# Patient Record
Sex: FEMALE | Race: BLACK OR AFRICAN AMERICAN | Employment: FULL TIME | URBAN - METROPOLITAN AREA
[De-identification: names, ages, dates, MRNs, and addresses within clinical notes are randomized per-mention and may not be internally consistent; named-entity substitution may affect disease eponyms.]

---

## 2017-04-26 ENCOUNTER — TRANSFERRED RECORDS (OUTPATIENT)
Dept: HEALTH INFORMATION MANAGEMENT | Facility: CLINIC | Age: 47
End: 2017-04-26

## 2017-08-01 ENCOUNTER — TRANSFERRED RECORDS (OUTPATIENT)
Dept: HEALTH INFORMATION MANAGEMENT | Facility: CLINIC | Age: 47
End: 2017-08-01

## 2019-03-09 ENCOUNTER — HOSPITAL ENCOUNTER (EMERGENCY)
Facility: CLINIC | Age: 49
Discharge: HOME | End: 2019-03-09
Attending: EMERGENCY MEDICINE | Admitting: EMERGENCY MEDICINE
Payer: COMMERCIAL

## 2019-03-09 VITALS
HEART RATE: 79 BPM | TEMPERATURE: 98 F | WEIGHT: 175.71 LBS | RESPIRATION RATE: 18 BRPM | SYSTOLIC BLOOD PRESSURE: 142 MMHG | DIASTOLIC BLOOD PRESSURE: 81 MMHG

## 2019-03-09 DIAGNOSIS — N60.02 BILATERAL BREAST CYSTS: ICD-10-CM

## 2019-03-09 DIAGNOSIS — N60.01 BILATERAL BREAST CYSTS: ICD-10-CM

## 2019-03-09 DIAGNOSIS — L73.9 FOLLICULITIS: ICD-10-CM

## 2019-03-09 LAB
ALBUMIN SERPL-MCNC: 3.6 G/DL (ref 3.4–5)
ALP SERPL-CCNC: 55 U/L (ref 40–150)
ALT SERPL W P-5'-P-CCNC: 21 U/L (ref 0–50)
ANION GAP SERPL CALCULATED.3IONS-SCNC: 8 MMOL/L (ref 3–14)
AST SERPL W P-5'-P-CCNC: 14 U/L (ref 0–45)
BASOPHILS # BLD AUTO: 0 10E9/L (ref 0–0.2)
BASOPHILS NFR BLD AUTO: 0.7 %
BILIRUB SERPL-MCNC: 0.4 MG/DL (ref 0.2–1.3)
BUN SERPL-MCNC: 15 MG/DL (ref 7–30)
CALCIUM SERPL-MCNC: 9 MG/DL (ref 8.5–10.1)
CHLORIDE SERPL-SCNC: 108 MMOL/L (ref 94–109)
CO2 SERPL-SCNC: 23 MMOL/L (ref 20–32)
CREAT SERPL-MCNC: 0.92 MG/DL (ref 0.52–1.04)
DIFFERENTIAL METHOD BLD: NORMAL
EOSINOPHIL # BLD AUTO: 0.1 10E9/L (ref 0–0.7)
EOSINOPHIL NFR BLD AUTO: 1.2 %
ERYTHROCYTE [DISTWIDTH] IN BLOOD BY AUTOMATED COUNT: 12.7 % (ref 10–15)
GFR SERPL CREATININE-BSD FRML MDRD: 73 ML/MIN/{1.73_M2}
GLUCOSE SERPL-MCNC: 78 MG/DL (ref 70–99)
HCT VFR BLD AUTO: 44.9 % (ref 35–47)
HGB BLD-MCNC: 14.2 G/DL (ref 11.7–15.7)
IMM GRANULOCYTES # BLD: 0 10E9/L (ref 0–0.4)
IMM GRANULOCYTES NFR BLD: 0.2 %
LYMPHOCYTES # BLD AUTO: 2.6 10E9/L (ref 0.8–5.3)
LYMPHOCYTES NFR BLD AUTO: 42.7 %
MCH RBC QN AUTO: 30.7 PG (ref 26.5–33)
MCHC RBC AUTO-ENTMCNC: 31.6 G/DL (ref 31.5–36.5)
MCV RBC AUTO: 97 FL (ref 78–100)
MONOCYTES # BLD AUTO: 0.4 10E9/L (ref 0–1.3)
MONOCYTES NFR BLD AUTO: 6.1 %
NEUTROPHILS # BLD AUTO: 3 10E9/L (ref 1.6–8.3)
NEUTROPHILS NFR BLD AUTO: 49.1 %
NRBC # BLD AUTO: 0 10*3/UL
NRBC BLD AUTO-RTO: 0 /100
PLATELET # BLD AUTO: 180 10E9/L (ref 150–450)
POTASSIUM SERPL-SCNC: 4.2 MMOL/L (ref 3.4–5.3)
PROT SERPL-MCNC: 7 G/DL (ref 6.8–8.8)
RBC # BLD AUTO: 4.62 10E12/L (ref 3.8–5.2)
SODIUM SERPL-SCNC: 139 MMOL/L (ref 133–144)
WBC # BLD AUTO: 6 10E9/L (ref 4–11)

## 2019-03-09 PROCEDURE — 85025 COMPLETE CBC W/AUTO DIFF WBC: CPT | Performed by: EMERGENCY MEDICINE

## 2019-03-09 PROCEDURE — 99283 EMERGENCY DEPT VISIT LOW MDM: CPT | Mod: Z6 | Performed by: EMERGENCY MEDICINE

## 2019-03-09 PROCEDURE — 36415 COLL VENOUS BLD VENIPUNCTURE: CPT | Performed by: EMERGENCY MEDICINE

## 2019-03-09 PROCEDURE — 80053 COMPREHEN METABOLIC PANEL: CPT | Performed by: EMERGENCY MEDICINE

## 2019-03-09 PROCEDURE — 99283 EMERGENCY DEPT VISIT LOW MDM: CPT | Performed by: EMERGENCY MEDICINE

## 2019-03-09 RX ORDER — ASPIRIN 81 MG/1
81 TABLET, CHEWABLE ORAL DAILY
COMMUNITY
End: 2019-03-12

## 2019-03-09 SDOH — HEALTH STABILITY: MENTAL HEALTH: HOW OFTEN DO YOU HAVE A DRINK CONTAINING ALCOHOL?: NEVER

## 2019-03-09 ASSESSMENT — ENCOUNTER SYMPTOMS
MYALGIAS: 1
FREQUENCY: 1
FATIGUE: 1
FEVER: 0

## 2019-03-09 NOTE — ED PROVIDER NOTES
Transfer EMERGENCY DEPARTMENT ([facility])  March 9, 2019    History     Chief Complaint   Patient presents with     Breast Mass     HPI  Kala Woo is a 48 year old female who presents the ED with rash for the past month.  She states that initially the rash was on her back in her bilateral breast, but now is more localized to her right breast and back.  She states that her mother also has this rash.  Patient reports that both her mother and her brothers have multiple myeloma and her sister has ovarian cancer.  She also complains of urinary frequency, fatigue, and generalized myalgias.  She states she still has regular menstrual cycles.  She otherwise currently denies any fevers or chance of pregnancy.    PAST MEDICAL HISTORY  History reviewed. No pertinent past medical history.  PAST SURGICAL HISTORY  History reviewed. No pertinent surgical history.  FAMILY HISTORY  History reviewed. No pertinent family history.  SOCIAL HISTORY  Social History     Tobacco Use     Smoking status: Current Every Day Smoker     Smokeless tobacco: Never Used   Substance Use Topics     Alcohol use: Yes     Frequency: Never     MEDICATIONS  No current facility-administered medications for this encounter.      Current Outpatient Medications   Medication     aspirin (ASA) 81 MG chewable tablet     ALLERGIES  No Known Allergies    I have reviewed the Medications, Allergies, Past Medical and Surgical History, and Social History in the Epic system.    Review of Systems   Constitutional: Positive for fatigue. Negative for fever.   Genitourinary: Positive for frequency.   Musculoskeletal: Positive for myalgias (generalized).   Skin: Positive for rash (R breast and back).   All other systems reviewed and are negative.      Physical Exam   BP: 142/81  Pulse: 79  Temp: 98  F (36.7  C)  Resp: 18  Weight: 79.7 kg (175 lb 11.3 oz)      Physical Exam   Constitutional: No distress.   HENT:   Head: Atraumatic.   Mouth/Throat: Oropharynx  is clear and moist. No oropharyngeal exudate.   Eyes: Pupils are equal, round, and reactive to light. No scleral icterus.   Cardiovascular: Normal heart sounds and intact distal pulses.   Pulmonary/Chest: Breath sounds normal. No respiratory distress.       Abdominal: Soft. Bowel sounds are normal. There is no tenderness.   Musculoskeletal: She exhibits no edema or tenderness.   Skin: Skin is warm. Rash noted. No lesion and no petechiae noted. Rash is papular ( Hyperpigmented slightly raised areas around follicles on her back, no pattern). She is not diaphoretic. No erythema.       ED Course        Procedures   3:31 PM  The patient was seen and examined by Dr. Gates in [facility].                Critical Care time:  none             Labs Ordered and Resulted from Time of ED Arrival Up to the Time of Departure from the ED   CBC WITH PLATELETS DIFFERENTIAL   COMPREHENSIVE METABOLIC PANEL            Assessments & Plan (with Medical Decision Making)   The patient has multiple small breast nodules consistent with fibrocystic breast changes.  I think she does not have any clinical signs of abscess.  She also has a rash on her back most consistent with folliculitis but there is no signs of petechiae or significant infection.  She was somewhat anxious but reassured that her laboratory tests are also normal as she has no signs of renal dysfunction or diabetes or infection on labs.    I have reviewed the nursing notes.    I have reviewed the findings, diagnosis, plan and need for follow up with the patient.       Medication List      There are no discharge medications for this visit.         Final diagnoses:   Folliculitis   Bilateral breast cysts     IAron, am serving as a trained medical scribe to document services personally performed by Adam Gates MD, based on the provider's statements to me.      IAdam MD, was physically present and have reviewed and verified the accuracy of this  note documented by Aron Chavez.     3/9/2019   [facility], [place], EMERGENCY DEPARTMENT     Adam Gates MD  03/10/19 [id number]

## 2019-03-09 NOTE — ED TRIAGE NOTES
Pt presents ambulatory to triage from home. Pt states for past 1 week noticed lump bilateral breast that are painful. Pt states has had rash throughout body for past 1 months. States is worse on back. Denies chronic health hx besides TIA.

## 2019-03-09 NOTE — ED AVS SNAPSHOT
[facility], [place], Emergency Department  [address] [place] [id number]  Phone:  [phone]                                    Kala Woo   MRN: [id number]    Department:  [facility], Emergency Department   Date of Visit:  3/9/2019           After Visit Summary Signature Page    I have received my discharge instructions, and my questions have been answered. I have discussed any challenges I see with this plan with the nurse or doctor.    ..........................................................................................................................................  Patient/Patient Representative Signature      ..........................................................................................................................................  Patient Representative Print Name and Relationship to Patient    ..................................................               ................................................  Date                                   Time    ..........................................................................................................................................  Reviewed by Signature/Title    ...................................................              ..............................................  Date                                               Time          22EPIC Rev 08/18

## 2019-03-12 ENCOUNTER — ANCILLARY PROCEDURE (OUTPATIENT)
Dept: ULTRASOUND IMAGING | Facility: CLINIC | Age: 49
End: 2019-03-12
Attending: PREVENTIVE MEDICINE
Payer: COMMERCIAL

## 2019-03-12 ENCOUNTER — ANCILLARY PROCEDURE (OUTPATIENT)
Dept: GENERAL RADIOLOGY | Facility: CLINIC | Age: 49
End: 2019-03-12
Attending: PREVENTIVE MEDICINE
Payer: COMMERCIAL

## 2019-03-12 ENCOUNTER — ANCILLARY PROCEDURE (OUTPATIENT)
Dept: MAMMOGRAPHY | Facility: CLINIC | Age: 49
End: 2019-03-12
Attending: PREVENTIVE MEDICINE
Payer: COMMERCIAL

## 2019-03-12 ENCOUNTER — OFFICE VISIT (OUTPATIENT)
Dept: FAMILY MEDICINE | Facility: CLINIC | Age: 49
End: 2019-03-12
Payer: COMMERCIAL

## 2019-03-12 VITALS
RESPIRATION RATE: 18 BRPM | WEIGHT: 173.2 LBS | DIASTOLIC BLOOD PRESSURE: 85 MMHG | SYSTOLIC BLOOD PRESSURE: 129 MMHG | TEMPERATURE: 97.9 F | HEART RATE: 97 BPM | OXYGEN SATURATION: 99 %

## 2019-03-12 DIAGNOSIS — Z87.891 PERSONAL HISTORY OF TOBACCO USE, PRESENTING HAZARDS TO HEALTH: ICD-10-CM

## 2019-03-12 DIAGNOSIS — N64.4 PAIN OF BOTH BREASTS: Primary | ICD-10-CM

## 2019-03-12 DIAGNOSIS — N64.4 PAIN OF BOTH BREASTS: ICD-10-CM

## 2019-03-12 DIAGNOSIS — Z13.220 LIPID SCREENING: ICD-10-CM

## 2019-03-12 DIAGNOSIS — B36.0 TINEA VERSICOLOR: ICD-10-CM

## 2019-03-12 DIAGNOSIS — M54.50 ACUTE MIDLINE LOW BACK PAIN WITHOUT SCIATICA: ICD-10-CM

## 2019-03-12 DIAGNOSIS — Z13.1 ENCOUNTER FOR SCREENING EXAMINATION FOR IMPAIRED GLUCOSE REGULATION AND DIABETES MELLITUS: ICD-10-CM

## 2019-03-12 LAB
CHOLEST SERPL-MCNC: 170 MG/DL
GLUCOSE SERPL-MCNC: 94 MG/DL (ref 70–99)
HDLC SERPL-MCNC: 50 MG/DL
LDLC SERPL CALC-MCNC: 102 MG/DL
NONHDLC SERPL-MCNC: 120 MG/DL
TRIGL SERPL-MCNC: 90 MG/DL

## 2019-03-12 PROCEDURE — 77066 DX MAMMO INCL CAD BI: CPT | Performed by: RADIOLOGY

## 2019-03-12 PROCEDURE — 72100 X-RAY EXAM L-S SPINE 2/3 VWS: CPT

## 2019-03-12 PROCEDURE — 36415 COLL VENOUS BLD VENIPUNCTURE: CPT | Performed by: PREVENTIVE MEDICINE

## 2019-03-12 PROCEDURE — G0279 TOMOSYNTHESIS, MAMMO: HCPCS | Performed by: RADIOLOGY

## 2019-03-12 PROCEDURE — 99214 OFFICE O/P EST MOD 30 MIN: CPT | Performed by: PREVENTIVE MEDICINE

## 2019-03-12 PROCEDURE — 80061 LIPID PANEL: CPT | Performed by: PREVENTIVE MEDICINE

## 2019-03-12 PROCEDURE — 76642 ULTRASOUND BREAST LIMITED: CPT | Mod: 50 | Performed by: RADIOLOGY

## 2019-03-12 PROCEDURE — 82947 ASSAY GLUCOSE BLOOD QUANT: CPT | Performed by: PREVENTIVE MEDICINE

## 2019-03-12 RX ORDER — KETOCONAZOLE 20 MG/ML
SHAMPOO TOPICAL ONCE
Qty: 120 ML | Refills: 1 | Status: SHIPPED | OUTPATIENT
Start: 2019-03-12 | End: 2019-03-12

## 2019-03-12 RX ORDER — ASPIRIN 81 MG/1
81 TABLET, CHEWABLE ORAL DAILY
Qty: 90 TABLET | Refills: 3 | Status: SHIPPED | OUTPATIENT
Start: 2019-03-12

## 2019-03-12 ASSESSMENT — PAIN SCALES - GENERAL: PAINLEVEL: EXTREME PAIN (9)

## 2019-03-12 NOTE — PROGRESS NOTES
SUBJECTIVE:   Kala Woo is a 48 year old female who presents to clinic today for the following health issues:      ED/UC Followup:    Facility:  [facility]  Date of visit: 3/9/19  Reason for visit: bilateral breast cysts, breast pain, folliculitis  Current Status: same -- pt needs order for mammogram, US     Family members with multiple myeloma  Sister with ovarian cancer    Back Pain       Duration: 1 week        Specific cause: none    Description:   Location of pain: low back right  Character of pain: sharp  Pain radiation:none  New numbness or weakness in legs, not attributed to pain:  In R hand/R forearm    Intensity: Currently 10/10, though appears very comfortable during the visit.     History:   Pain interferes with job: No  History of back problems: no prior back problems  Any previous MRI or X-rays: None  Sees a specialist for back pain:  No  Therapies tried without relief: Hot, Cold packs    Alleviating factors:   Improved by: None      Precipitating factors:  Worsened by: standing too long, sitting too long    Accompanying Signs & Symptoms:  Risk of Fracture:  None  Risk of Cauda Equina:  None  Risk of Infection:  None  Risk of Cancer:  Family history of myeloma   Risk of Ankylosing Spondylitis:  Onset at age <35, male, AND morning back stiffness. no     Bilateral Breast Pain:  -sharp pain  -Pain for months, did not think much of it   -No nipple discharge  -Had mammogram in 2016  -No family history of breast cancer  -No hormone use  -No radiation use  -No kids   -Tobacco use+     Back rash:  -Years  -Itching+      Problem list and histories reviewed & adjusted, as indicated.  Additional history: as documented    There is no problem list on file for this patient.    History reviewed. No pertinent surgical history.    Social History     Tobacco Use     Smoking status: Current Every Day Smoker     Smokeless tobacco: Never Used   Substance Use Topics     Alcohol use: Yes      Frequency: Never     History reviewed. No pertinent family history.      Current Outpatient Medications   Medication Sig Dispense Refill     aspirin (ASA) 81 MG chewable tablet Take 1 tablet (81 mg) by mouth daily 90 tablet 3     ketoconazole (NIZORAL) 2 % external shampoo Apply topically once for 1 dose Apply to affected areas and wash after 5 minutes. 120 mL 1     No Known Allergies  BP Readings from Last 3 Encounters:   03/12/19 129/85   03/09/19 142/81    Wt Readings from Last 3 Encounters:   03/12/19 78.6 kg (173 lb 3.2 oz)   03/09/19 79.7 kg (175 lb 11.3 oz)                  Labs reviewed in EPIC    Reviewed and updated as needed this visit by clinical staff  Tobacco  Allergies  Meds  Problems  Med Hx  Surg Hx  Fam Hx       Reviewed and updated as needed this visit by Provider  Tobacco  Allergies  Meds  Problems  Med Hx  Surg Hx  Fam Hx         ROS:  Constitutional, neuro, ENT, endocrine, pulmonary, cardiac, gastrointestinal, genitourinary, musculoskeletal, integument and psychiatric systems are negative, except as otherwise noted.    OBJECTIVE:                                                    /85 (BP Location: Right arm, Patient Position: Sitting, Cuff Size: Adult Regular)   Pulse 97   Temp 97.9  F (36.6  C) (Oral)   Resp 18   Wt 78.6 kg (173 lb 3.2 oz)   LMP 03/04/2019   SpO2 99%   There is no height or weight on file to calculate BMI.    GENERAL APPEARANCE: healthy, alert and no distress  EYES: Eyes grossly normal to inspection and conjunctivae and sclerae normal  HENT: nose and mouth without ulcers or lesions  NECK: no adenopathy and trachea midline and normal to palpation  RESP: lungs clear to auscultation - no rales, rhonchi or wheezes  CV: regular rates and rhythm, normal S1 S2, no S3 or S4 and no murmur, click or rub  ABDOMEN: soft, non-tender and no rebound or guarding   MS: extremities normal- no gross deformities noted and peripheral pulses normal  SKIN: hyperpigmented  patches on the back, some scaling, also some post inflammatory hyperpigmentation.   NEURO: Normal strength and tone, mentation intact and speech normal  PSYCH: mentation appears normal  Breasts: normal without suspicious masses, skin changes or axillary nodes, symmetric fibrous changes in both upper outer quadrants.    Lumbar spine: No swelling, bruising, erythema atrophy or deformity.  No tenderness noted on palpation of spinous processes. Some tenderness over palpation of the right iliac crest.  Range of motion of the lumbar spine is full in all directions without focal deficit.  Strength is full.  SLR negative bilaterally.  Distal motor and sensory exam is intact.  Reflexes are 2+ and symmetrical.  Distal pulses intact. No sacroiliac tenderness bilaterally.  Great toe extension normal.     Diagnostic test results:  Diagnostic Test Results:  Results for orders placed or performed in visit on 03/12/19 (from the past 24 hour(s))   Lipid panel reflex to direct LDL Fasting   Result Value Ref Range    Cholesterol 170 <200 mg/dL    Triglycerides 90 <150 mg/dL    HDL Cholesterol 50 >49 mg/dL    LDL Cholesterol Calculated 102 (H) <100 mg/dL    Non HDL Cholesterol 120 <130 mg/dL   Glucose   Result Value Ref Range    Glucose 94 70 - 99 mg/dL        LUMBAR SPINE 2-3 VIEWS 3/12/2019 9:54 AM      COMPARISON: None     HISTORY: Acute midline low back pain without sciatica.                                                                      IMPRESSION: There is normal alignment of the lumbar vertebrae.  Vertebral body heights of the lumbar spine are normal. Lumbar  intervertebral disc space heights are normal. There is no evidence for  fracture of the lumbar spine. There is facet arthropathy bilaterally  at the L4-L5 and L5-S1 levels.     HARVEY DUMONT MD      ASSESSMENT/PLAN:                                                    1. Pain of both breasts  -Await results of imaging  -patient is very concerned due to recent diagnosis  of family members with multiple myeloma, requesting genetic testing and M protein check, will discuss at follow up appointment   - MA Diagnostic Digital Bilateral; Future  - US Breast Bilateral Complete 4 Quadrants; Future    2. Personal history of tobacco use, presenting hazards to health  -Also has a history of a TIA   - aspirin (ASA) 81 MG chewable tablet; Take 1 tablet (81 mg) by mouth daily  Dispense: 90 tablet; Refill: 3    3. Acute midline low back pain without sciatica  -No acute changes on X rays  -Heat application  -Tylenol or Ibuprofen as needed  -if symptoms are not better in 4 weeks then plan for Physical Therapy and referral   - XR Lumbar Spine 2/3 Views; Future    4. Tinea versicolor  -with likely super imposed post inflammatory hyperpigmentation   - ketoconazole (NIZORAL) 2 % external shampoo; Apply topically once for 1 dose Apply to affected areas and wash after 5 minutes.  Dispense: 120 mL; Refill: 1  -if not better then refer to Dermatology     5. Lipid screening   - Lipid panel reflex to direct LDL Fasting    6. Encounter for screening examination for impaired glucose regulation and diabetes mellitus  - Glucose      Follow up with Provider - 4 weeks if not better  Due for pap, will schedule a follow up      Ann Andersen MD MPH    [facility]

## 2019-03-12 NOTE — RESULT ENCOUNTER NOTE
Kala, your test results were within normal limits.  Cholesterol is at goal for you.  Glucose is normal, you do not have diabetes.     Please do not hesitate to call us at [phone] if you have any questions or concerns.    Thank you,    Ann Andersen MD MPH

## 2019-03-12 NOTE — RESULT ENCOUNTER NOTE
Kala,     X rays of the low back did not show any acute bony abnormalities.     Please do not hesitate to call us at [phone] if you have any questions or concerns.    Thank you,    Ann Andersen MD MPH

## 2019-03-12 NOTE — LETTER
Kala Woo  [address] [address]        March 12, 2019  Date of Exam:       Dear Kala:    Thank you for your recent visit.  Mammogram Result: Normal. We are pleased to inform you that the interpretation of your recent mammography did not find cancer.    Breast Problems: If you are experiencing any breast problems such as a lump or localized pain we request that you discuss this with your health care provider if you haven t already done so, as additional testing may be necessary.    Your Next Mammogram: The American College of Radiology and the Society of Breast Imaging recommend a yearly screening mammogram beginning at the age of 40 as the most effective way of saving lives from breast cancer. Please be aware that other screening recommendations do exist.    Mammogram Records: A report of your mammogram results was sent to the health care provider you indicated. Your mammogram and report will become part of your medical file here at [facility] for at least 10 years. You are responsible for informing any new health care provider or mammography facility of the date and location of this examination.     We appreciate the opportunity to participate in your health care.    Sincerely,    Dr. Ng  Interpreting Radiologist  [facility]

## 2019-03-15 ENCOUNTER — OFFICE VISIT (OUTPATIENT)
Dept: FAMILY MEDICINE | Facility: CLINIC | Age: 49
End: 2019-03-15
Payer: COMMERCIAL

## 2019-03-15 VITALS
OXYGEN SATURATION: 99 % | BODY MASS INDEX: 30.62 KG/M2 | HEART RATE: 85 BPM | DIASTOLIC BLOOD PRESSURE: 88 MMHG | WEIGHT: 172.8 LBS | TEMPERATURE: 97.7 F | HEIGHT: 63 IN | SYSTOLIC BLOOD PRESSURE: 139 MMHG

## 2019-03-15 DIAGNOSIS — M54.50 ACUTE MIDLINE LOW BACK PAIN WITHOUT SCIATICA: ICD-10-CM

## 2019-03-15 DIAGNOSIS — N92.0 MENORRHAGIA WITH REGULAR CYCLE: ICD-10-CM

## 2019-03-15 DIAGNOSIS — Z12.4 SCREENING FOR MALIGNANT NEOPLASM OF CERVIX: ICD-10-CM

## 2019-03-15 DIAGNOSIS — Z00.00 ROUTINE HISTORY AND PHYSICAL EXAMINATION OF ADULT: Primary | ICD-10-CM

## 2019-03-15 PROCEDURE — G0145 SCR C/V CYTO,THINLAYER,RESCR: HCPCS | Performed by: PREVENTIVE MEDICINE

## 2019-03-15 PROCEDURE — 99396 PREV VISIT EST AGE 40-64: CPT | Performed by: PREVENTIVE MEDICINE

## 2019-03-15 PROCEDURE — 87624 HPV HI-RISK TYP POOLED RSLT: CPT | Performed by: PREVENTIVE MEDICINE

## 2019-03-15 PROCEDURE — 99214 OFFICE O/P EST MOD 30 MIN: CPT | Mod: 25 | Performed by: PREVENTIVE MEDICINE

## 2019-03-15 RX ORDER — METHOCARBAMOL 500 MG/1
500 TABLET, FILM COATED ORAL 4 TIMES DAILY PRN
Qty: 60 TABLET | Refills: 0 | Status: SHIPPED | OUTPATIENT
Start: 2019-03-15

## 2019-03-15 ASSESSMENT — MIFFLIN-ST. JEOR: SCORE: 1377.95

## 2019-03-15 ASSESSMENT — PAIN SCALES - GENERAL: PAINLEVEL: EXTREME PAIN (8)

## 2019-03-15 NOTE — PROGRESS NOTES
SUBJECTIVE:   CC: Kala Woo is an 48 year old woman who presents for preventive health visit.     Healthy Habits:    Do you get at least three servings of calcium containing foods daily (dairy, green leafy vegetables, etc.)? no    Amount of exercise or daily activities, outside of work: not really    Problems taking medications regularly No    Medication side effects: No    Have you had an eye exam in the past two years? no    Do you see a dentist twice per year? yes    Do you have sleep apnea, excessive snoring or daytime drowsiness?no      Was seen in clinic on 3/12/19 for breast pain, had diagnostic mammogram and ultrasounds done, showed cysts. We discussed a good supportive bra, Tylenol or Ibuprofen, alternating heat and ice. If not better then will need referral to a Breast Specialist.    Also, wanted to follow up on the back pain that she was seen for on 3/12/19, X rays showed some facet arthropathy but no other issues. Does not want to do Physical Therapy as has not been helpful in the past.     Vaginal Bleeding (Dysmenorrhea)      Onset: 10 years     Description:  Duration of bleeding episodes: 7 days   Frequency between periods:  regular  Describe bleeding/flow:   Clots: YES  Number of pads/hour: 1, has to use a pad and tampon every hour  Cramping: moderate    Intensity:  severe    Accompanying signs and symptoms: None     History (similar episodes/previous evaluation): None    Precipitating or alleviating factors: None    Therapies tried and outcome: None      Today's PHQ-2 Score:   PHQ-2 ( 1999 [employer]) 3/15/2019   Q1: Little interest or pleasure in doing things 1   Q2: Feeling down, depressed or hopeless 0   PHQ-2 Score 1       Abuse: Current or Past(Physical, Sexual or Emotional)- No  Do you feel safe in your environment? Yes    Social History     Tobacco Use     Smoking status: Current Every Day Smoker     Smokeless tobacco: Never Used   Substance Use Topics     Alcohol use: Yes     Frequency:  Never     If you drink alcohol do you typically have >3 drinks per day or >7 drinks per week? No                     Reviewed orders with patient.  Reviewed health maintenance and updated orders accordingly - Yes  Labs reviewed in EPIC  BP Readings from Last 3 Encounters:   03/15/19 139/88   03/12/19 129/85   03/09/19 142/81    Wt Readings from Last 3 Encounters:   03/15/19 78.4 kg (172 lb 12.8 oz)   03/12/19 78.6 kg (173 lb 3.2 oz)   03/09/19 79.7 kg (175 lb 11.3 oz)                  There is no problem list on file for this patient.    History reviewed. No pertinent surgical history.    Social History     Tobacco Use     Smoking status: Current Every Day Smoker     Smokeless tobacco: Never Used   Substance Use Topics     Alcohol use: Yes     Frequency: Never     History reviewed. No pertinent family history.      Current Outpatient Medications   Medication Sig Dispense Refill     aspirin (ASA) 81 MG chewable tablet Take 1 tablet (81 mg) by mouth daily 90 tablet 3     methocarbamol (ROBAXIN) 500 MG tablet Take 1 tablet (500 mg) by mouth 4 times daily as needed for muscle spasms 60 tablet 0     No Known Allergies    Mammogram Screening: Patient under age 50, mutual decision reflected in health maintenance.      Pertinent mammograms are reviewed under the imaging tab.  History of abnormal Pap smear: NO - age 30-65 PAP every 5 years with negative HPV co-testing recommended     Reviewed and updated as needed this visit by clinical staff  Tobacco  Allergies  Meds  Problems  Med Hx  Surg Hx  Fam Hx  Soc Hx          Reviewed and updated as needed this visit by Provider  Tobacco  Allergies  Meds  Problems  Med Hx  Surg Hx  Fam Hx        History reviewed. No pertinent past medical history.   History reviewed. No pertinent surgical history.    ROS:  CONSTITUTIONAL: NEGATIVE for fever, chills, change in weight  INTEGUMENTARU/SKIN: NEGATIVE for worrisome rashes, moles or lesions  EYES: NEGATIVE for vision changes  "or irritation  ENT: NEGATIVE for ear, mouth and throat problems  RESP: NEGATIVE for significant cough or SOB  BREAST: NEGATIVE for masses, tenderness or discharge  CV: NEGATIVE for chest pain, palpitations or peripheral edema  GI: NEGATIVE for nausea, abdominal pain, heartburn, or change in bowel habits  MUSCULOSKELETAL: NEGATIVE for significant arthralgias or myalgia  NEURO: NEGATIVE for weakness, dizziness or paresthesias  ENDOCRINE: NEGATIVE for temperature intolerance, skin/hair changes  HEME/ALLERGY/IMMUNE: NEGATIVE for bleeding problems  PSYCHIATRIC: NEGATIVE for changes in mood or affect    OBJECTIVE:   /88 (BP Location: Left arm, Patient Position: Chair, Cuff Size: Adult Large)   Pulse 85   Temp 97.7  F (36.5  C) (Oral)   Ht 1.6 m (5' 3\")   Wt 78.4 kg (172 lb 12.8 oz)   LMP 03/04/2019   SpO2 99%   BMI 30.61 kg/m    EXAM:  GENERAL: healthy, alert and no distress  EYES: Eyes grossly normal to inspection, PERRL and conjunctivae and sclerae normal  HENT: ear canals and TM's normal, nose and mouth without ulcers or lesions  NECK: no adenopathy, no asymmetry  RESP: lungs clear to auscultation - no rales, rhonchi or wheezes  CV: regular rate and rhythm, normal S1 S2, no S3 or S4, no murmur, click or rub, no peripheral edema and peripheral pulses strong  ABDOMEN: soft, nontender, no hepatosplenomegaly, no masses and bowel sounds normal   (female): normal female external genitalia, normal urethral meatus, vaginal mucosa pink, moist, well rugated, and normal cervix/adnexa/uterus appears enlarged+  MS: no gross musculoskeletal defects noted, no edema  SKIN: no suspicious lesions or rashes  NEURO: Normal strength and tone, mentation intact and speech normal  PSYCH: mentation appears normal, affect normal/bright  LYMPH: no cervical, supraclavicular adenopathy    Diagnostic Test Results:  Results for orders placed or performed in visit on 03/12/19    Breast Bilateral Limited 1-3 Quadrants    Narrative    " Exam: Bilateral diagnostic digital mammogram with computer aided  detection, tomosynthesis and bilateral breast ultrasound, 3/12/2019    Comparison: None available. Baseline.    Technique: Tomosynthesis.    History: Patient reports bilateral breast pain for 2 to 3 months. Aunt  with breast cancer, unknown age at diagnosis. Sister with BRCA2  mutation and sister with ovarian cancer at the age of 61.    BREAST DENSITY: Extremely dense.    Findings:   The mammogram is negative and unchanged including the regions of pain  bilaterally.    Focused ultrasound by radiologist and technologist in the symptomatic  areas superiorly and laterally on the left and superiorly and medially  on the right demonstrates some scattered clusters of benign-appearing  microcysts but are otherwise unremarkable. Ultrasound of the upper  outer quadrant posteriorly on the right is also unremarkable.      Impression    IMPRESSION: BI-RADS CATEGORY: 2 - Benign Finding(s).    RECOMMENDED FOLLOW-UP: Annual Mammography    Clinical follow-up of breast symptoms. Given lack of imaging findings,  management would need to be based on clinical grounds.    Results discussed with the patient.    Based on the patient history questionnaire completed prior to the  mammogram, the NCI risk calculator and the NCCN indications for  genetic screening, the patient may be at an increased risk for breast  cancer and/or have an indication for genetic screening.  She has been  sent a letter informing her of this and a phone number to schedule an  appointment in the [facility] if she so desires.    Code: MONI    I have personally reviewed the examination and initial interpretation  and I agree with the findings.    SHELLY ORTEGA MD     Office Visit on 03/12/2019   Component Date Value Ref Range Status     Cholesterol 03/12/2019 170  <200 mg/dL Final     Triglycerides 03/12/2019 90  <150 mg/dL Final    Fasting specimen     HDL Cholesterol 03/12/2019 50  >49 mg/dL Final  "    LDL Cholesterol Calculated 03/12/2019 102* <100 mg/dL Final    Comment: Above desirable:  100-129 mg/dl  Borderline High:  130-159 mg/dL  High:             160-189 mg/dL  Very high:       >189 mg/dl       Non HDL Cholesterol 03/12/2019 120  <130 mg/dL Final     Glucose 03/12/2019 94  70 - 99 mg/dL Final    Fasting specimen       ASSESSMENT/PLAN:   Kala was seen today for physical.    Diagnoses and all orders for this visit:    Routine history and physical examination of adult  -Labs previously done were discussed with the patient     Screening for malignant neoplasm of cervix  -     Pap imaged thin layer screen with HPV - recommended age 30 - 65 years (select HPV order below)  -     HPV High Risk Types DNA Cervical  -Screening guidelines reviewed     Acute midline low back pain without sciatica  -     ORTHO  REFERRAL  -     methocarbamol (ROBAXIN) 500 MG tablet; Take 1 tablet (500 mg) by mouth 4 times daily as needed for muscle spasms, sedation side effect of medication reviewed  -Declined Physical Therapy     Menorrhagia with regular cycle  -     US Pelvic Complete w Transvaginal; Future  -Symptoms for 10 years  -Not anemic on last blood count       COUNSELING:   Reviewed preventive health counseling, as reflected in patient instructions       Regular exercise       Healthy diet/nutrition    BP Readings from Last 1 Encounters:   03/15/19 139/88     Estimated body mass index is 30.61 kg/m  as calculated from the following:    Height as of this encounter: 1.6 m (5' 3\").    Weight as of this encounter: 78.4 kg (172 lb 12.8 oz).    BP Screening:   Last 3 BP Readings:    BP Readings from Last 3 Encounters:   03/15/19 139/88   03/12/19 129/85   03/09/19 142/81       The following was recommended to the patient:  Re-screen BP within a year and recommended lifestyle modifications  Weight management plan: Discussed healthy diet and exercise guidelines     reports that she has been smoking.  she has never used " smokeless tobacco.  Tobacco Cessation Action Plan: Information offered: Patient not interested at this time    Counseling Resources:  ATP IV Guidelines  Pooled Cohorts Equation Calculator  Breast Cancer Risk Calculator  FRAX Risk Assessment  ICSI Preventive Guidelines  Dietary Guidelines for Americans, 2010  [employer] MyPlate  ASA Prophylaxis  Lung CA Screening    Ann Andersen MD MPH    [facility]

## 2019-03-19 LAB
COPATH REPORT: NORMAL
PAP: NORMAL

## 2019-03-21 LAB
FINAL DIAGNOSIS: NORMAL
HPV HR 12 DNA CVX QL NAA+PROBE: NEGATIVE
HPV16 DNA SPEC QL NAA+PROBE: NEGATIVE
HPV18 DNA SPEC QL NAA+PROBE: NEGATIVE
SPECIMEN DESCRIPTION: NORMAL
SPECIMEN SOURCE CVX/VAG CYTO: NORMAL

## 2019-03-25 ENCOUNTER — OFFICE VISIT (OUTPATIENT)
Dept: ORTHOPEDICS | Facility: CLINIC | Age: 49
End: 2019-03-25
Payer: COMMERCIAL

## 2019-03-25 ENCOUNTER — ANCILLARY PROCEDURE (OUTPATIENT)
Dept: ULTRASOUND IMAGING | Facility: CLINIC | Age: 49
End: 2019-03-25
Attending: PREVENTIVE MEDICINE
Payer: COMMERCIAL

## 2019-03-25 VITALS
BODY MASS INDEX: 30.48 KG/M2 | HEART RATE: 83 BPM | HEIGHT: 63 IN | SYSTOLIC BLOOD PRESSURE: 132 MMHG | WEIGHT: 172 LBS | DIASTOLIC BLOOD PRESSURE: 89 MMHG

## 2019-03-25 DIAGNOSIS — N92.0 MENORRHAGIA WITH REGULAR CYCLE: ICD-10-CM

## 2019-03-25 DIAGNOSIS — M54.41 CHRONIC BILATERAL LOW BACK PAIN WITH RIGHT-SIDED SCIATICA: Primary | ICD-10-CM

## 2019-03-25 DIAGNOSIS — G89.29 CHRONIC BILATERAL LOW BACK PAIN WITH RIGHT-SIDED SCIATICA: Primary | ICD-10-CM

## 2019-03-25 PROCEDURE — 76830 TRANSVAGINAL US NON-OB: CPT

## 2019-03-25 PROCEDURE — 99214 OFFICE O/P EST MOD 30 MIN: CPT | Performed by: FAMILY MEDICINE

## 2019-03-25 PROCEDURE — 76856 US EXAM PELVIC COMPLETE: CPT

## 2019-03-25 RX ORDER — PREDNISONE 20 MG/1
40 TABLET ORAL DAILY
Qty: 10 TABLET | Refills: 0 | Status: SHIPPED | OUTPATIENT
Start: 2019-03-25 | End: 2019-03-30

## 2019-03-25 ASSESSMENT — PAIN SCALES - GENERAL: PAINLEVEL: EXTREME PAIN (8)

## 2019-03-25 ASSESSMENT — MIFFLIN-ST. JEOR: SCORE: 1374.32

## 2019-03-25 NOTE — PROGRESS NOTES
CHIEF COMPLAINT:  Consult (back pain, for about 3 years, no known injury mostly on the right side. )       HISTORY OF PRESENT ILLNESS  Ms. Woo is a pleasant 48 year old year old female who presents to clinic today with back pain.  Kala is seen at the request of Dr. Andersen.    Kala's back pain started a few years ago with no clear inciting event.  This was much worse 2 or 3 weeks ago, prompting a visit to her primary care office.  She points to her lumbar spine region, pain at times radiates around the right side of her hip to her anterior and lateral thigh.  Her thigh will go numb at times.  She also reports some weakness in her right leg.  She has tried changing positions, Tylenol, ibuprofen, muscle relaxers.  Little relief so far.  Kala has generally been active for most of her life.  She has not yet tried physical therapy.    Additional history: as documented    MEDICAL HISTORY  There is no problem list on file for this patient.      Current Outpatient Medications   Medication Sig Dispense Refill     aspirin (ASA) 81 MG chewable tablet Take 1 tablet (81 mg) by mouth daily 90 tablet 3     methocarbamol (ROBAXIN) 500 MG tablet Take 1 tablet (500 mg) by mouth 4 times daily as needed for muscle spasms 60 tablet 0       No Known Allergies    No family history on file.    Additional medical/Social/Surgical histories reviewed in [facility] and updated as appropriate.     REVIEW OF SYSTEMS (3/25/2019)  CONSTITUTIONAL: Denies fever and weight loss  EYES: Denies acute vision changes  ENT: Denies hearing changes or difficulty swallowing  CARDIAC: Denies chest pain or edema  RESPIRATORY: Denies dyspnea, cough or wheeze  GASTROINTESTINAL: Denies abdominal pain, nausea, vomiting  MUSCULOSKELETAL: See HPI  SKIN: Denies any recent rash or lesion  NEUROLOGICAL: Denies numbness or focal weakness  PSYCHIATRIC: No history of psychiatric symptoms or problems  ENDOCRINE: Denies current diagnosis of diabetes  HEMATOLOGY: Denies episodes  "of easy bleeding      PHYSICAL EXAM    Vitals:    03/25/19 0708   BP: 132/89   BP Location: Right arm   Patient Position: Sitting   Pulse: 83   Weight: 78 kg (172 lb)   Height: 1.6 m (5' 3\")     General  - normal appearance, in no obvious distress  CV  - normal peripheral perfusion  Pulm  - normal respiratory pattern, non-labored  Musculoskeletal - lumbar spine  - stance: normal gait  - inspection: normal bone and joint alignment, no obvious scoliosis  - palpation: bilateral lumbar paravertebral tenderness, R > L  - ROM: pain with bilateral rotation, flexion past 30 deg, extension  - strength: lower extremities 5/5 in all planes  - special tests:  (-) slump test  Neuro  - patellar and Achilles DTRs 2+ bilaterally, no sensory or motor deficit, grossly normal coordination, normal muscle tone  Skin  - no ecchymosis, erythema, warmth, or induration, no obvious rash  Psych  - interactive, appropriate, normal mood and affect         ASSESSMENT & PLAN  Ms. Woo is a 48 year old year old female who presents to clinic today with low back pain.    I reviewed her x-ray in the room with her which reads:  IMPRESSION: There is normal alignment of the lumbar vertebrae.  Vertebral body heights of the lumbar spine are normal. Lumbar  intervertebral disc space heights are normal. There is no evidence for  fracture of the lumbar spine. There is facet arthropathy bilaterally  at the L4-L5 and L5-S1 levels.    Clinically Kala presents with some degree of facet arthropathy and possible discogenic pain.    I do think she will improve to a good degree with physical therapy, I am referring her to PT to start strengthening and range of motion, as well as to discuss some preventative techniques.  I did prescribe her a round of prednisone.  I also instructed her to take her Robaxin at bedtime, if need be.    We should follow-up in 4-6 weeks.  If her symptoms are persistent, or worsening, I would likely order an MRI    Thank you for allowing " me to participate in Kala's care.    Shashank Mccormick DO, CAQSM  Primary Care Sports Medicine

## 2019-03-25 NOTE — PROGRESS NOTES
"      [facility]  3/25/2019    Kala Woo's chief complaint for this visit includes:  Chief Complaint   Patient presents with     Consult     back pain, for about 3 years, no known injury mostly on the right side.      PCP: [facility]    Referring Provider:  No referring provider defined for this encounter.    Ht 1.6 m (5' 3\")   Wt 78 kg (172 lb)   LMP 03/04/2019   BMI 30.47 kg/m    Extreme Pain (8)       Reason for visit:     What part of your body is injured / painful?  right low back    What caused the injury /pain? No inciting event     How long ago did your injury occur or pain begin? problem is longstanding    What are your most bothersome symptoms? Pain    How would you characterize your symptom?  aching    What makes your symptoms better? Nothing    What makes your symptoms worse? Standing and Walking    Have you been previously seen for this problem? No    Medical History:    Any recent changes to your medical history? No    Any new medication prescribed since last visit? No    Have you had surgery on this body part before? No    Social History:    Occupation:      Review of Systems:    Do you have fever, chills, weight loss? No    Do you have any vision problems? Yes - making an appointment     Do you have any chest pain or edema? No    Do you have any shortness of breath or wheezing?  No    Do you have stomach problems? No    Do you have any numbness or focal weakness? No    Do you have diabetes? No    Do you have problems with bleeding or clotting? No    Do you have an rashes or other skin lesions? No         "

## 2019-03-25 NOTE — PATIENT INSTRUCTIONS
Thanks for coming today.  Ortho/Sports Medicine Clinic  [address] [place] [address]    To schedule future appointments in Ortho Clinic, you may call [phone].    To schedule ordered imaging by your Provider: Call [facility] at [phone]    [employer] available online at:   [email or web address]    Please call if any further questions or concerns [phone] and ask for the Orthopedic Department. Clinic hours 8 am to 5 pm.    Return to clinic if symptoms worsen.

## 2019-03-25 NOTE — LETTER
3/25/2019         RE: Kala Woo  [address] [place] [address]        Dear Colleague,    Thank you for referring your patient, Kala Woo, to the [facility]. Please see a copy of my visit note below.    CHIEF COMPLAINT:  Consult (back pain, for about 3 years, no known injury mostly on the right side. )       HISTORY OF PRESENT ILLNESS  Ms. Woo is a pleasant 48 year old year old female who presents to clinic today with back pain.  Kala is seen at the request of Dr. Andersen.    Kala's back pain started a few years ago with no clear inciting event.  This was much worse 2 or 3 weeks ago, prompting a visit to her primary care office.  She points to her lumbar spine region, pain at times radiates around the right side of her hip to her anterior and lateral thigh.  Her thigh will go numb at times.  She also reports some weakness in her right leg.  She has tried changing positions, Tylenol, ibuprofen, muscle relaxers.  Little relief so far.  Kala has generally been active for most of her life.  She has not yet tried physical therapy.    Additional history: as documented    MEDICAL HISTORY  There is no problem list on file for this patient.      Current Outpatient Medications   Medication Sig Dispense Refill     aspirin (ASA) 81 MG chewable tablet Take 1 tablet (81 mg) by mouth daily 90 tablet 3     methocarbamol (ROBAXIN) 500 MG tablet Take 1 tablet (500 mg) by mouth 4 times daily as needed for muscle spasms 60 tablet 0       No Known Allergies    No family history on file.    Additional medical/Social/Surgical histories reviewed in [facility] and updated as appropriate.     REVIEW OF SYSTEMS (3/25/2019)  CONSTITUTIONAL: Denies fever and weight loss  EYES: Denies acute vision changes  ENT: Denies hearing changes or difficulty swallowing  CARDIAC: Denies chest pain or edema  RESPIRATORY: Denies dyspnea, cough or wheeze  GASTROINTESTINAL: Denies abdominal pain, nausea,  "vomiting  MUSCULOSKELETAL: See HPI  SKIN: Denies any recent rash or lesion  NEUROLOGICAL: Denies numbness or focal weakness  PSYCHIATRIC: No history of psychiatric symptoms or problems  ENDOCRINE: Denies current diagnosis of diabetes  HEMATOLOGY: Denies episodes of easy bleeding      PHYSICAL EXAM    Vitals:    03/25/19 0708   BP: 132/89   BP Location: Right arm   Patient Position: Sitting   Pulse: 83   Weight: 78 kg (172 lb)   Height: 1.6 m (5' 3\")     General  - normal appearance, in no obvious distress  CV  - normal peripheral perfusion  Pulm  - normal respiratory pattern, non-labored  Musculoskeletal - lumbar spine  - stance: normal gait  - inspection: normal bone and joint alignment, no obvious scoliosis  - palpation: bilateral lumbar paravertebral tenderness, R > L  - ROM: pain with bilateral rotation, flexion past 30 deg, extension  - strength: lower extremities 5/5 in all planes  - special tests:  (-) slump test  Neuro  - patellar and Achilles DTRs 2+ bilaterally, no sensory or motor deficit, grossly normal coordination, normal muscle tone  Skin  - no ecchymosis, erythema, warmth, or induration, no obvious rash  Psych  - interactive, appropriate, normal mood and affect         ASSESSMENT & PLAN  Ms. Woo is a 48 year old year old female who presents to clinic today with low back pain.    I reviewed her x-ray in the room with her which reads:  IMPRESSION: There is normal alignment of the lumbar vertebrae.  Vertebral body heights of the lumbar spine are normal. Lumbar  intervertebral disc space heights are normal. There is no evidence for  fracture of the lumbar spine. There is facet arthropathy bilaterally  at the L4-L5 and L5-S1 levels.    Clinically Kala presents with some degree of facet arthropathy and possible discogenic pain.    I do think she will improve to a good degree with physical therapy, I am referring her to PT to start strengthening and range of motion, as well as to discuss some " "preventative techniques.  I did prescribe her a round of prednisone.  I also instructed her to take her Robaxin at bedtime, if need be.    We should follow-up in 4-6 weeks.  If her symptoms are persistent, or worsening, I would likely order an MRI    Thank you for allowing me to participate in Kala's care.    Shashank Mccormick DO, CAQSM  Primary Care Sports Medicine                 [facility]  3/25/2019    Kala Woo's chief complaint for this visit includes:  Chief Complaint   Patient presents with     Consult     back pain, for about 3 years, no known injury mostly on the right side.      PCP: [facility], [facility]    Referring Provider:  No referring provider defined for this encounter.    Ht 1.6 m (5' 3\")   Wt 78 kg (172 lb)   LMP 03/04/2019   BMI 30.47 kg/m     Extreme Pain (8)       Reason for visit:     What part of your body is injured / painful?  right low back    What caused the injury /pain? No inciting event     How long ago did your injury occur or pain begin? problem is longstanding    What are your most bothersome symptoms? Pain    How would you characterize your symptom?  aching    What makes your symptoms better? Nothing    What makes your symptoms worse? Standing and Walking    Have you been previously seen for this problem? No    Medical History:    Any recent changes to your medical history? No    Any new medication prescribed since last visit? No    Have you had surgery on this body part before? No    Social History:    Occupation:      Review of Systems:    Do you have fever, chills, weight loss? No    Do you have any vision problems? Yes - making an appointment     Do you have any chest pain or edema? No    Do you have any shortness of breath or wheezing?  No    Do you have stomach problems? No    Do you have any numbness or focal weakness? No    Do you have diabetes? No    Do you have problems with bleeding or clotting? No    Do you have " an rashes or other skin lesions? No         Again, thank you for allowing me to participate in the care of your patient.        Sincerely,        Shashank Mccormick, DO

## 2019-03-26 NOTE — RESULT ENCOUNTER NOTE
Kala,     Ultrasound of the pelvis did show fibroids in the uterus. I would recommend follow up with Gynecology for further management.     Please do not hesitate to call us at [phone] if you have any questions or concerns.    Thank you,    Ann Andersen MD MPH

## 2019-04-09 ENCOUNTER — OFFICE VISIT (OUTPATIENT)
Dept: OBGYN | Facility: CLINIC | Age: 49
End: 2019-04-09
Payer: COMMERCIAL

## 2019-04-09 VITALS
BODY MASS INDEX: 30.82 KG/M2 | SYSTOLIC BLOOD PRESSURE: 116 MMHG | DIASTOLIC BLOOD PRESSURE: 80 MMHG | TEMPERATURE: 97.9 F | HEART RATE: 93 BPM | WEIGHT: 174 LBS

## 2019-04-09 DIAGNOSIS — N94.6 DYSMENORRHEA: ICD-10-CM

## 2019-04-09 DIAGNOSIS — N92.1 MENOMETRORRHAGIA: ICD-10-CM

## 2019-04-09 DIAGNOSIS — Z80.9 FAMILY HISTORY OF CANCER: ICD-10-CM

## 2019-04-09 DIAGNOSIS — Z80.41 FAMILY HISTORY OF MALIGNANT NEOPLASM OF OVARY: ICD-10-CM

## 2019-04-09 DIAGNOSIS — D25.9 UTERINE LEIOMYOMA, UNSPECIFIED LOCATION: ICD-10-CM

## 2019-04-09 PROBLEM — Z72.0 TOBACCO ABUSE: Status: ACTIVE | Noted: 2019-04-09

## 2019-04-09 PROBLEM — Z86.73 HISTORY OF TIA (TRANSIENT ISCHEMIC ATTACK) AND STROKE: Status: ACTIVE | Noted: 2019-04-09

## 2019-04-09 PROCEDURE — 99203 OFFICE O/P NEW LOW 30 MIN: CPT | Performed by: OBSTETRICS & GYNECOLOGY

## 2019-04-09 NOTE — PATIENT INSTRUCTIONS
If you have any questions regarding your visit, Please contact your care team.     [employer]: [phone]  [facility] CLINIC HOURS TELEPHONE NUMBER       Jose Black M.D.        Margaret-    RN-      Omni-Medical Assistant       Monday-[facility]  8:00a.m-4:45 p.m  Tuesday-[facility]  9:00a.m-4:00 p.m  Wednesday-[facility] 8:00a.m-4:45 p.m.  Thursday-[place]  8:00a.m-4:45 p.m.  Friday-[place]  8:00a.m-4:45 p.m. [facility]  [address]  [place], [place] [address] [phone] ask [facility]  [phone] Fax  Imaging [phone]    [facility]  [address] [place] [address] [address].  [place], [place] [address] [phone]    [facility]  [address] [place] [address]  [phone] ask [facility]  [phone] Fax  Imaging [phone]     Urgent Care locations:    [place]        [place] Monday-Friday  5 pm - 9 pm  Saturday and Sunday   9 am - 5 pm  Monday-Friday   11 am - 9 pm  Saturday and Sunday   9 am - 5 pm   [phone]   If you need a medication refill, please contact your pharmacy. Please allow 3 business days for your refill to be completed.  As always, Thank you for trusting us with your healthcare needs!

## 2019-04-10 NOTE — PROGRESS NOTES
OB-GYN Problem-Oriented Visit or Consultation      Kala Woo is a 48 year old year old P 0 who presents with a chief complaint of menometrorrhagia and fibroids.  Referred by Dr. Andersen.  Patient's last menstrual period was 03/18/2019.    HPI:     Menorrhagia x 10 yrs but worse in the past year. Menses now q 14-21 days x 10 days with severe dysmenorrhea. Contraceptive method is NSA. Smoker and history of TIA. Recent Hgb 14.2 and Pap/HRHPV neg. Pelvic ultrasound reviewed including images with two 3 and 2 cm intramural fibroids (reported as subserosal but look intramural), 5 mm endometrial stripe, and normal ovaries.     Family history of cancer including sister with ovarian cancer and BRCA 2 pos and mother and two brothers with multiple myeloma. Patient had a CA-125 of 7 last yr.     Past medical, obstetrical, surgical, family and social history reviewed and as noted or updated in chart.     Allergies, meds and supplements are as noted or updated in chart.      ROS:   Systems reviewed include:  constitutional, breast, gastrointestinal, genitourinary, psychological, hematologic/lymphatic and endocrine.    These systems were negative for significant symptoms except for the following additional: none; see HPI.    EXAM:  VS as noted. /80   Pulse 93   Temp 97.9  F (36.6  C) (Oral)   Wt 78.9 kg (174 lb)   LMP 03/18/2019   Breastfeeding? No   BMI 30.82 kg/m    Constitutionally normal.     Exam not repeated at this time.    Assessment:   Encounter Diagnoses   Name Primary?     Family history of cancer      Family history of malignant neoplasm of ovary      Uterine leiomyoma, unspecified location      Menometrorrhagia      Dysmenorrhea          I reviewed the condition, causes, differential diagnosis, prognosis, evaluation and management considerations and options.  Questions answered and information given. See orders.         Plan and Recommendations: Genetic counseling for cancer in family.  This will help  determine fibroid and menometrorrhagia treatment by hysterectomy, with possible bilateral salpingo-oophorectomy, or UFE. Discussed treatment, RBA generally.   Anticipate total abdominal hysterectomy, bilateral salpingo-oophorectomy with special attention on Pathology.   May be eligible for family investigation of multiple myeloma through new [facility] testing.   Recent breast evaluation also noted.   Encouraged smoking cessation.   Total encounter time (physician together with patient) = 30min. Direct counseling, education and care coordination time (physician together with patient) = 20min.     A/P:  Kala was seen today for follow up.    Diagnoses and all orders for this visit:    Family history of cancer  -     CANCER RISK MGMT/CANCER GENETIC COUNSELING REFERRAL    Family history of malignant neoplasm of ovary  -     CANCER RISK MGMT/CANCER GENETIC COUNSELING REFERRAL    Uterine leiomyoma, unspecified location    Menometrorrhagia    Dysmenorrhea        Jose Black MD

## 2019-05-03 NOTE — TELEPHONE ENCOUNTER
ONCOLOGY INTAKE: Records Information      APPT INFORMATION: 06/28 W/Kne  Referring provider:  Jose Black MD  Referring provider s clinic:  [facility]  Reason for visit/diagnosis:  Family history of cancer [Z80.9];Family history of malignant neoplasm of ovary [Z80.41    RECORDS INFORMATION:  Were the records received with the referral (via [employer])? IN [facility]    Has patient been seen for any external appt for this diagnosis? NO    If yes, where? NA    Has patient had any imaging or procedures outside of [facility] for this condition? NO      If Yes, where? NA    ADDITIONAL INFORMATION:  Family history of cancer [Z80.9];Family history of malignant neoplasm of ovary [Z80.41]: caller intake: ref by:Jose Black MD-[facility]: Records In [facility]

## 2019-06-28 ENCOUNTER — PRE VISIT (OUTPATIENT)
Dept: ONCOLOGY | Facility: CLINIC | Age: 49
End: 2019-06-28

## 2019-06-28 ENCOUNTER — OFFICE VISIT (OUTPATIENT)
Dept: ONCOLOGY | Facility: CLINIC | Age: 49
End: 2019-06-28
Attending: OBSTETRICS & GYNECOLOGY
Payer: COMMERCIAL

## 2019-06-28 VITALS
OXYGEN SATURATION: 100 % | SYSTOLIC BLOOD PRESSURE: 139 MMHG | RESPIRATION RATE: 16 BRPM | HEART RATE: 69 BPM | WEIGHT: 174.4 LBS | BODY MASS INDEX: 30.89 KG/M2 | DIASTOLIC BLOOD PRESSURE: 96 MMHG | TEMPERATURE: 98.5 F

## 2019-06-28 DIAGNOSIS — Z80.7 FAMILY HISTORY OF MULTIPLE MYELOMA: ICD-10-CM

## 2019-06-28 DIAGNOSIS — Z80.42 FAMILY HISTORY OF PROSTATE CANCER: ICD-10-CM

## 2019-06-28 DIAGNOSIS — Z80.41 FAMILY HISTORY OF MALIGNANT NEOPLASM OF OVARY: ICD-10-CM

## 2019-06-28 DIAGNOSIS — Z80.3 FAMILY HISTORY OF MALIGNANT NEOPLASM OF BREAST: ICD-10-CM

## 2019-06-28 DIAGNOSIS — Z80.41 FAMILY HISTORY OF MALIGNANT NEOPLASM OF OVARY: Primary | ICD-10-CM

## 2019-06-28 DIAGNOSIS — Z84.81 FAMILY HISTORY OF BRCA GENE MUTATION: ICD-10-CM

## 2019-06-28 LAB — MISCELLANEOUS TEST: NORMAL

## 2019-06-28 PROCEDURE — 96040 ZZH GENETIC COUNSELING, EACH 30 MINUTES: CPT | Mod: ZF | Performed by: GENETIC COUNSELOR, MS

## 2019-06-28 PROCEDURE — 36415 COLL VENOUS BLD VENIPUNCTURE: CPT

## 2019-06-28 ASSESSMENT — PAIN SCALES - GENERAL: PAINLEVEL: NO PAIN (0)

## 2019-06-28 NOTE — PROGRESS NOTES
6/28/2019    Referring Provider: Jose Black MD    Presenting Information:   I met with Kala Woo today for genetic counseling at the Cancer Risk Management Program at the [facility] to discuss her family history of breast and ovarian cancer, including an identified BRCA1 mutation in the family.  She is here today to review this history and available genetic testing options.    Personal History:  Kala is a 48 year old female. She does not have any personal history of cancer.  She had her first menstrual period at age 11, reports that her last menstrual period was about four months ago, and does not have children.  Kala has her ovaries, fallopian tubes and uterus in place, and has been seen due to uterine fibroids (most recent transvaginal ultrasound was 3/25/2019), in which she is considering surgery.   Her most recent mammogram and ultrasound from 3/12/2019 showed benign findings, and her breast density is extremely dense.  Kala is interested in screening for multiple myeloma, due to her family history.  She reports tobacco use.   She is interested in seeking counseling services for anxiety, and was encouraged to discuss this with her primary care provider.       Family History: (Please see scanned pedigree for detailed family history information)    One sister was diagnosed with ovarian cancer at age 61 and is now 62.  She reports completing genetic testing which identified a BRCA1 gene mutation, however a copy of this report was not available today for review.      Kala has two brothers who have been recently diagnosed with multiple myeloma (in their late 50's-60's)    Her mother was diagnosed with multiple myeloma in her 80's    One maternal aunt passed away in her 40's due to lung cancer    One maternal aunt passed away at age 70 and had a history of leukemia (details unknown)    Her maternal grandfather was diagnosed with prostate cancer in his 70's    One maternal cousin had a history of  cancer, however additional details were not available    One paternal aunt was diagnosed with breast cancer in her 40's and passed away at age 95    Kala reports that two paternal aunts and several cousins had a history of cancer, however additional information was not available today.      Her maternal ethnicity is /. Her paternal ethnicity is .  There is no known Ashkenazi Pentec[employer]al ancestry on either side of her family.     Discussion:    Kala has a family history of breast and ovarian cancer and reports that her sister carries a BRCA1 gene mutation.  We discussed the impact of this family history and testing on Kala in detail.      We discussed the natural history and genetics of Hereditary Breast and Ovarian Cancer syndrome. Hereditary Breast and Ovarian Cancer Syndrome (HBOC) is caused by a mutation in the BRCA1 or BRCA2 gene.  HBOC typically presents with multiple family members diagnosed with breast cancer before age 50 and/or ovarian cancer. Other cancer risks associated with HBOC include male breast cancer, prostate cancer, pancreatic cancer, and melanoma.     We reviewed that mutations in the BRCA1 gene are inherited in an autosomal dominant pattern.  This means that Kala has a 50% chance of inheriting the same mutation which was identified in her sister.  Targeted testing for the known familial mutation would be available to Kala should she obtain a copy of her sister's positive test report.      We discussed that there are additional genes that could cause increased risk for ovarian and breast cancer. This testing may be considered as a copy of her sister's report is not available, and she has a paternal family history of cancer (including early onset breast cancer).  It has not yet been determined whether the mutation that she reports was identified in her sister was inherited from their maternal or paternal family.  As many of these genes present with overlapping  features in a family and accurate cancer risk cannot always be established based upon the pedigree analysis alone, it would be reasonable for Kala to consider panel genetic testing to analyze multiple genes at once.     A detailed handout regarding hereditary breast and ovarian cancer and the information we discussed was provided to Kala at the end of our appointment today and can be found in the after visit summary.  Topics included: inheritance pattern, cancer risks, cancer screening recommendations, and also risks, benefits and limitations of testing.    We discussed available genetic testing options, including targeted genetic testing for a known familial variant and expanded panel testing for additional genes associated with hereditary breast and ovarian cancer.  Kala expressed interest in learning as much information as possible from testing, and elected to proceed with the [facility] gene panel.  I encouraged Kala to obtain a copy of her sister's test report prior to her return genetic counseling visit.      Genetic testing is available for 34 genes associated with hereditary cancer: CancerNext (APC, MIKAEL, BARD1, BRCA1, BRCA2, BRIP1, BMPR1A, CDH1, CDK4, CDKN2A, CHEK2, DICER1, EPCAM, GREM1, HOXB13, MLH1, MRE11A, MSH2, MSH6, MUTYH, NBN, NF1, PALB2, PMS2, POLD1, POLE, PTEN, RAD50, RAD51C, RAD51D, SMAD4, SMARCA4, STK11, and TP53).  We discussed that many of the genes in the CancerNext panel are associated with specific hereditary cancer syndromes and published management guidelines: Hereditary Breast and Ovarian Cancer syndrome (BRCA1, BRCA2), Ornelas syndrome (MLH1, MSH2, MSH6, PMS2, EPCAM), Familial Adenomatous Polyposis (APC), Hereditary Diffuse Gastric Cancer (CDH1), Familial Atypical Multiple Mole Melanoma syndrome (CDK4, CDKN2A), Juvenile Polyposis syndrome (BMPR1A, SMAD4), Cowden syndrome (PTEN), Li Fraumeni syndrome (TP53), Peutz-Jeghers syndrome (STK11), MUTYH Associated Polyposis (MUTYH), and  Neurofibromatosis type 1 (NF1).   The MIKAEL, BRIP1, CHEK2, GREM1, NBN, PALB2, POLD1, POLE, RAD51C, and RAD51D genes are associated with increased cancer risk and have published management guidelines for certain cancers.    The remaining genes (BARD1, DICER1, HOXB13, MRE11A, RAD50, and SMARCA4) are associated with increased cancer risk and may allow us to make medical recommendations when mutations are identified.    Kala was provided with a detailed brochure from [facility] explaining the CancerNext testing.  Consent was obtained and genetic testing for CancerNext was sent to [facility]. Turn around time: 4-6 weeks. Kala plans to obtain a copy of her sister's positive test report following her visit today.  My contact information was provided.     Medical Management: For Kala, we reviewed that the information from genetic testing may determine:    additional cancer screening for which Kala may qualify (i.e. mammogram and breast MRI, more frequent colonoscopies, more frequent dermatologic exams, etc.),    options for risk reducing surgeries Kala could consider (i.e. bilateral mastectomy, surgery to remove her ovaries and/or uterus, etc.),      and targeted chemotherapies for certain cancers should they be needed in the future (i.e. immunotherapy for individuals with Ornelas syndrome, PARP inhibitors, etc.).      These recommendations will be discussed in detail once genetic testing is completed.    It came up during our appointment today that Kala would be interested in counseling services due to current anxiety.  She is encouraged to connect with her primary care provider to discuss these resources further.  A staff message has also been sent to her referring provider, per her request.      Plan:  1) Today Kala elected to proceed with the CancerNext gene panel through [facility].  2) This information should be available in 4-6 weeks.  3) Kala will schedule a return visit to discuss the  results.    Face to face time: 45 minutes    Pippa Casper MS, [facility]  Licensed Genetic Counselor  [phone]

## 2019-06-28 NOTE — LETTER
Cancer Risk Management  Program Locations    [facility]  [facility]  Mailing Address  Cancer Risk Management Program  [address]  [place] [address]    New patient appointments  [phone]  June 28, 2019    Kala Woo  [address] [facility] [facility] [address]      Dear Kala,    It was a pleasure meeting with you at the [facility] on 6/28/2019.  Here is a copy of the progress note from your recent genetic counseling visit to the Cancer Risk Management Program.  If you have any additional questions, please feel free to call.    Referring Provider: Jose Black MD    Presenting Information:   I met with Kala Woo today for genetic counseling at the Cancer Risk Management Program at the [facility] to discuss her family history of breast and ovarian cancer, including an identified BRCA1 mutation in the family.  She is here today to review this history and available genetic testing options.    Personal History:  Kala is a 48 year old female. She does not have any personal history of cancer.  She had her first menstrual period at age 11, reports that her last menstrual period was about four months ago, and does not have children.  Kala has her ovaries, fallopian tubes and uterus in place, and has been seen due to uterine fibroids (most recent transvaginal ultrasound was 3/25/2019), in which she is considering surgery.   Her most recent mammogram and ultrasound from 3/12/2019 showed benign findings, and her breast density is extremely dense.  Kala is interested in screening for multiple myeloma, due to her family history.  She reports tobacco use.  She is interested in seeking counseling services for anxiety, and was encouraged to discuss this with her primary care provider.        Family History: (Please see scanned pedigree for detailed family history information)    One sister was diagnosed with ovarian cancer at age 61 and is now 62.  She reports completing genetic testing which identified a BRCA1 gene mutation, however a copy of this report was not available today for review.      Kala has two brothers who have been recently diagnosed with multiple myeloma (in their late 50's-60's)    Her mother was diagnosed with multiple myeloma in her 80's    One maternal aunt passed away in her 40's due to lung cancer    One maternal aunt passed away at age 70 and had a history of leukemia (details unknown)    Her maternal grandfather was diagnosed with prostate cancer in his 70's    One maternal cousin had a history of cancer, however additional details were not available    One paternal aunt was diagnosed with breast cancer in her 40's and passed away at age 95    Kala reports that two paternal aunts and several cousins had a history of cancer, however additional information was not available today.      Her maternal ethnicity is /. Her paternal ethnicity is .  There is no known Ashkenazi Baptism ancestry on either side of her family.     Discussion:    Kala has a family history of breast and ovarian cancer and reports that her sister carries a BRCA1 gene mutation.  We discussed the impact of this family history and testing on Kala in detail.      We discussed the natural history and genetics of Hereditary Breast and Ovarian Cancer syndrome. Hereditary Breast and Ovarian Cancer Syndrome (HBOC) is caused by a mutation in the BRCA1 or BRCA2 gene.  HBOC typically presents with multiple family members diagnosed with breast cancer before age 50 and/or ovarian cancer. Other cancer risks associated with HBOC include male breast cancer, prostate cancer, pancreatic cancer, and melanoma.     We reviewed that mutations in the BRCA1 gene are inherited in an autosomal  dominant pattern.  This means that Kala has a 50% chance of inheriting the same mutation which was identified in her sister.  Targeted testing for the known familial mutation would be available to Kala should she obtain a copy of her sister's positive test report.      We discussed that there are additional genes that could cause increased risk for ovarian and breast cancer. This testing may be considered as a copy of her sister's report is not available, and she has a paternal family history of cancer (including early onset breast cancer).  It has not yet been determined whether the mutation that she reports was identified in her sister was inherited from their maternal or paternal family.  As many of these genes present with overlapping features in a family and accurate cancer risk cannot always be established based upon the pedigree analysis alone, it would be reasonable for Kala to consider panel genetic testing to analyze multiple genes at once.     A detailed handout regarding hereditary breast and ovarian cancer and the information we discussed was provided to Kala at the end of our appointment today and can be found in the after visit summary.  Topics included: inheritance pattern, cancer risks, cancer screening recommendations, and also risks, benefits and limitations of testing.    We discussed available genetic testing options, including targeted genetic testing for a known familial variant and expanded panel testing for additional genes associated with hereditary breast and ovarian cancer.  Kala expressed interest in learning as much information as possible from testing, and elected to proceed with the [facility]Next gene panel.  I encouraged Kala to obtain a copy of her sister's test report prior to her return genetic counseling visit.      Genetic testing is available for 34 genes associated with hereditary cancer: CancerNext (APC, MIKAEL, BARD1, BRCA1, BRCA2, BRIP1, BMPR1A, CDH1, CDK4, CDKN2A, CHEK2,  DICER1, EPCAM, GREM1, HOXB13, MLH1, MRE11A, MSH2, MSH6, MUTYH, NBN, NF1, PALB2, PMS2, POLD1, POLE, PTEN, RAD50, RAD51C, RAD51D, SMAD4, SMARCA4, STK11, and TP53).  We discussed that many of the genes in the CancerNext panel are associated with specific hereditary cancer syndromes and published management guidelines: Hereditary Breast and Ovarian Cancer syndrome (BRCA1, BRCA2), Ornelas syndrome (MLH1, MSH2, MSH6, PMS2, EPCAM), Familial Adenomatous Polyposis (APC), Hereditary Diffuse Gastric Cancer (CDH1), Familial Atypical Multiple Mole Melanoma syndrome (CDK4, CDKN2A), Juvenile Polyposis syndrome (BMPR1A, SMAD4), Cowden syndrome (PTEN), Li Fraumeni syndrome (TP53), Peutz-Jeghers syndrome (STK11), MUTYH Associated Polyposis (MUTYH), and Neurofibromatosis type 1 (NF1).   The MIKAEL, BRIP1, CHEK2, GREM1, NBN, PALB2, POLD1, POLE, RAD51C, and RAD51D genes are associated with increased cancer risk and have published management guidelines for certain cancers.    The remaining genes (BARD1, DICER1, HOXB13, MRE11A, RAD50, and SMARCA4) are associated with increased cancer risk and may allow us to make medical recommendations when mutations are identified.    Kala was provided with a detailed brochure from [facility] explaining the CancerNext testing.  Consent was obtained and genetic testing for CancerNext was sent to [facility]. Turn around time: 4-6 weeks. Kala plans to obtain a copy of her sister's positive test report following her visit today.  My contact information was provided.     Medical Management: For  Kala, we reviewed that the information from genetic testing may determine:    additional cancer screening for which Kala may qualify (i.e. mammogram and breast MRI, more frequent colonoscopies, more frequent dermatologic exams, etc.),    options for risk reducing surgeries Kala could consider (i.e. bilateral mastectomy, surgery to remove her ovaries and/or uterus, etc.),      and targeted  chemotherapies for certain cancers should they be needed in the future (i.e. immunotherapy for individuals with Ornelas syndrome, PARP inhibitors, etc.).       These recommendations will be discussed in detail once genetic testing is completed.    Plan:  1) Today Kala elected to proceed with the CancerNext gene panel through [facility].  2) This information should be available in 4-6 weeks.  3) Kala will schedule a return visit to discuss the results.    Pippa Casper MS, [facility]  Licensed Genetic Counselor  [phone]

## 2019-06-28 NOTE — LETTER
6/28/2019       RE: Kala Woo  [address] [facility] [facility] [place] [address]     Dear Colleague,    Thank you for referring your patient, Kala Woo, to the [facility]. Please see a copy of my visit note below.    6/28/2019    Referring Provider: Jose Black MD    Presenting Information:   I met with Kala Woo today for genetic counseling at the Cancer Risk Management Program at the [facility] to discuss her family history of breast and ovarian cancer, including an identified BRCA1 mutation in the family.  She is here today to review this history and available genetic testing options.    Personal History:  Kala is a 48 year old female. She does not have any personal history of cancer.  She had her first menstrual period at age 11, reports that her last menstrual period was about four months ago, and does not have children.  Kala has her ovaries, fallopian tubes and uterus in place, and has been seen due to uterine fibroids (most recent transvaginal ultrasound was 3/25/2019), in which she is considering surgery.   Her most recent mammogram and ultrasound from 3/12/2019 showed benign findings, and her breast density is extremely dense.  Kala is interested in screening for multiple myeloma, due to her family history.  She reports tobacco use.   She is interested in seeking counseling services for anxiety, and was encouraged to discuss this with her primary care provider.       Family History: (Please see scanned pedigree for detailed family history information)    One sister was diagnosed with ovarian cancer at age 61 and is now 62.  She reports completing genetic testing which identified a BRCA1 gene mutation, however a copy of this report was not available today for review.      Kala has two brothers who have been recently diagnosed with multiple myeloma (in their late 50's-60's)    Her mother was diagnosed with multiple myeloma in her 80's    One maternal aunt passed away  in her 40's due to lung cancer    One maternal aunt passed away at age 70 and had a history of leukemia (details unknown)    Her maternal grandfather was diagnosed with prostate cancer in his 70's    One maternal cousin had a history of cancer, however additional details were not available    One paternal aunt was diagnosed with breast cancer in her 40's and passed away at age 95    Kala reports that two paternal aunts and several cousins had a history of cancer, however additional information was not available today.      Her maternal ethnicity is /. Her paternal ethnicity is .  There is no known Ashkenazi Religion ancestry on either side of her family.     Discussion:    Kaal has a family history of breast and ovarian cancer and reports that her sister carries a BRCA1 gene mutation.  We discussed the impact of this family history and testing on Kala in detail.      We discussed the natural history and genetics of Hereditary Breast and Ovarian Cancer syndrome. Hereditary Breast and Ovarian Cancer Syndrome (HBOC) is caused by a mutation in the BRCA1 or BRCA2 gene.  HBOC typically presents with multiple family members diagnosed with breast cancer before age 50 and/or ovarian cancer. Other cancer risks associated with HBOC include male breast cancer, prostate cancer, pancreatic cancer, and melanoma.     We reviewed that mutations in the BRCA1 gene are inherited in an autosomal dominant pattern.  This means that Kala has a 50% chance of inheriting the same mutation which was identified in her sister.  Targeted testing for the known familial mutation would be available to Kala should she obtain a copy of her sister's positive test report.      We discussed that there are additional genes that could cause increased risk for ovarian and breast cancer. This testing may be considered as a copy of her sister's report is not available, and she has a paternal family history of cancer (including  early onset breast cancer).  It has not yet been determined whether the mutation that she reports was identified in her sister was inherited from their maternal or paternal family.  As many of these genes present with overlapping features in a family and accurate cancer risk cannot always be established based upon the pedigree analysis alone, it would be reasonable for Kala to consider panel genetic testing to analyze multiple genes at once.     A detailed handout regarding hereditary breast and ovarian cancer and the information we discussed was provided to Kala at the end of our appointment today and can be found in the after visit summary.  Topics included: inheritance pattern, cancer risks, cancer screening recommendations, and also risks, benefits and limitations of testing.    We discussed available genetic testing options, including targeted genetic testing for a known familial variant and expanded panel testing for additional genes associated with hereditary breast and ovarian cancer.  Kala expressed interest in learning as much information as possible from testing, and elected to proceed with the [facility]Next gene panel.  I encouraged Kala to obtain a copy of her sister's test report prior to her return genetic counseling visit.      Genetic testing is available for 34 genes associated with hereditary cancer: CancerNext (APC, MIKAEL, BARD1, BRCA1, BRCA2, BRIP1, BMPR1A, CDH1, CDK4, CDKN2A, CHEK2, DICER1, EPCAM, GREM1, HOXB13, MLH1, MRE11A, MSH2, MSH6, MUTYH, NBN, NF1, PALB2, PMS2, POLD1, POLE, PTEN, RAD50, RAD51C, RAD51D, SMAD4, SMARCA4, STK11, and TP53).  We discussed that many of the genes in the CancerNext panel are associated with specific hereditary cancer syndromes and published management guidelines: Hereditary Breast and Ovarian Cancer syndrome (BRCA1, BRCA2), Ornelas syndrome (MLH1, MSH2, MSH6, PMS2, EPCAM), Familial Adenomatous Polyposis (APC), Hereditary Diffuse Gastric Cancer (CDH1), Familial Atypical  Multiple Mole Melanoma syndrome (CDK4, CDKN2A), Juvenile Polyposis syndrome (BMPR1A, SMAD4), Cowden syndrome (PTEN), Li Fraumeni syndrome (TP53), Peutz-Jeghers syndrome (STK11), MUTYH Associated Polyposis (MUTYH), and Neurofibromatosis type 1 (NF1).   The MIKAEL, BRIP1, CHEK2, GREM1, NBN, PALB2, POLD1, POLE, RAD51C, and RAD51D genes are associated with increased cancer risk and have published management guidelines for certain cancers.    The remaining genes (BARD1, DICER1, HOXB13, MRE11A, RAD50, and SMARCA4) are associated with increased cancer risk and may allow us to make medical recommendations when mutations are identified.    Kala was provided with a detailed brochure from [facility] explaining the CancerNext testing.  Consent was obtained and genetic testing for CancerNext was sent to [facility]. Turn around time: 4-6 weeks. Kala plans to obtain a copy of her sister's positive test report following her visit today.  My contact information was provided.     Medical Management: For  Kala, we reviewed that the information from genetic testing may determine:    additional cancer screening for which Kaal may qualify (i.e. mammogram and breast MRI, more frequent colonoscopies, more frequent dermatologic exams, etc.),    options for risk reducing surgeries Kala could consider (i.e. bilateral mastectomy, surgery to remove her ovaries and/or uterus, etc.),      and targeted chemotherapies for certain cancers should they be needed in the future (i.e. immunotherapy for individuals with Ornelas syndrome, PARP inhibitors, etc.).       These recommendations will be discussed in detail once genetic testing is completed.    It came up during our appointment today that Kala would be interested in counseling services due to current anxiety.  She is encouraged to connect with her primary care provider to discuss these resources further.  A staff message has also been sent to her referring provider, per her  request.      Plan:  1) Today Kala elected to proceed with the CancerNext gene panel through [facility].  2) This information should be available in 4-6 weeks.  3) Kala will schedule a return visit to discuss the results.    Face to face time: 45 minutes    Pippa Casper MS, [facility]  Licensed Genetic Counselor  [phone]

## 2019-06-28 NOTE — PATIENT INSTRUCTIONS
Genetic testing is available for 34 genes associated with hereditary cancer: CancerNext (APC, MIKAEL, BARD1, BRCA1, BRCA2, BRIP1, BMPR1A, CDH1, CDK4, CDKN2A, CHEK2, DICER1, EPCAM, GREM1, HOXB13, MLH1, MRE11A, MSH2, MSH6, MUTYH, NBN, NF1, PALB2, PMS2, POLD1, POLE, PTEN, RAD50, RAD51C, RAD51D, SMAD4, SMARCA4, STK11, and TP53).    Assessing Cancer Risk  Only about 5-10% of cancers are thought to be due to an inherited cancer susceptibility gene.    These families often have:    Several people with the same or related types of cancer    Cancers diagnosed at a young age (before age 50)    Individuals with more than one primary cancer    Multiple generations of the family affected with cancer    Some people may be candidates for genetic testing of more than one gene.  For these families, genetic testing using a cancer panel may be offered.  These panels will test different genes known to increase the risk for breast, ovarian, uterine, and/or other cancers. All of the genes discussed below have published clinical management guidelines for individuals who are found to carry a mutation. The purpose of this handout is to serve as a brief summary of the genes analyzed by the panels used to inquire about hereditary breast and gynecologic cancer:  MIKAEL, BRCA1, BRCA2, BRIP1, CDH1, CHEK2, MLH1, MSH2, MSH6, PMS2, EPCAM, PTEN, PALB2, RAD51C, RAD51D, and TP53.  ______________________________________________________________________________  Hereditary Breast and Ovarian Cancer Syndrome   (BRCA1 and BRCA2)  A single mutation in one of the copies of BRCA1 or BRCA2 increases the risk for breast and ovarian cancer, among others.  The risk for pancreatic cancer and melanoma may also be slightly increased in some families.  The chart below shows the chance that someone with a BRCA mutation would develop cancer in his or her lifetime1,2,3,4.        A person s ethnic background is also important to consider, as individuals of Ashkenazi Zoroastrian  ancestry have a higher chance of having a BRCA gene mutation.  There are three BRCA mutations that occur more frequently in this population.    Ornelas Syndrome   (MLH1, MSH2, MSH6, PMS2, and EPCAM)  Currently five genes are known to cause Ornelas Syndrome: MLH1, MSH2, MSH6, PMS2, and EPCAM.  A single mutation in one of the Ornelas Syndrome genes increases the risk for colon, endometrial, ovarian, and stomach cancers.  Other cancers that occur less commonly in Ornelas Syndrome include urinary tract, skin, and brain cancers.  The chart below shows the chance that a person with Ornelas syndrome would develop cancer in his or her lifetime5.      *Cancer risk varies depending on Ornelas syndrome gene found    Cowden Syndrome   (PTEN)  Cowden syndrome is a hereditary condition that increases the risk for breast, thyroid, endometrial, colon, and kidney cancer.  Cowden syndrome is caused by a mutation in the PTEN gene.  A single mutation in one of the copies of PTEN causes Cowden syndrome and increases cancer risk.  The chart below shows the chance that someone with a PTEN mutation would develop cancer in their lifetime6,7.  Other benign features seen in some individuals with Cowden syndrome include benign skin lesions (facial papules, keratoses, lipomas), learning disability, autism, thyroid nodules, colon polyps, and larger head size.      *One recent study found breast cancer risk to be increased to 85%    Li-Fraumeni Syndrome   (TP53)  Li-Fraumeni Syndrome (LFS) is a cancer predisposition syndrome caused by a mutation in the TP53 gene. A single mutation in one of the copies of TP53 increases the risk for multiple cancers. Individuals with LFS are at an increased risk for developing cancer at a young age. The lifetime risk for development of a LFS-associated cancer is 50% by age 30 and 90% by age 60.   Core Cancers: Sarcomas, Breast, Brain, Lung, Leukemias/Lymphomas, Adrenocortical carcinomas  Other Cancers: Gastrointestinal,  Thyroid, Skin, Genitourinary    Hereditary Diffuse Gastric Cancer   (CDH1)  Currently, one gene is known to cause hereditary diffuse gastric cancer (HDGC): CDH1.  Individuals with HDGC are at increased risk for diffuse gastric cancer and lobular breast cancer. Of people diagnosed with HDGC, 30-50% have a mutation in the CDH1 gene.  This suggests there are likely other genes that may cause HDGC that have not been identified yet.      Lifetime Cancer Risks    General Population HDGC    Diffuse Gastric  <1% ~80%   Breast 12% 39-52%         Additional Genes  MIKAEL  MIKAEL is a moderate-risk breast cancer gene. Women who have a mutation in MIKAEL can have between a 2-4 fold increased risk for breast cancer compared to the general population8. MIKAEL mutations have also been associated with increased risk for pancreatic cancer, however an estimate of this cancer risk is not well understood9. Individuals who inherit two MIKAEL mutations have a condition called ataxia-telangiectasia (AT).  This rare autosomal recessive condition affects the nervous system and immune system, and is associated with progressive cerebellar ataxia beginning in childhood.  Individuals with ataxia-telangiectasia often have a weakened immune system and have an increased risk for childhood cancers.    PALB2  Mutations in PALB2 have been shown to increase the risk of breast cancer up to 33-58% in some families; where individuals fall within this risk range is dependent upon family [id number]. PALB2 mutations have also been associated with increased risk for pancreatic cancer, although this risk has not been quantified yet.  Individuals who inherit two PALB2 mutations--one from their mother and one from their father--have a condition called Fanconi Anemia.  This rare autosomal recessive condition is associated with short stature, developmental delay, bone marrow failure, and increased risk for childhood cancers.    CHEK2   CHEK2 is a moderate-risk breast cancer  gene.  Women who have a mutation in CHEK2 have around a 2-fold increased risk for breast cancer compared to the general population, and this risk may be higher depending upon family history.11,12,13 Mutations in CHEK2 have also been shown to increase the risk of a number of other cancers, including colon and prostate, however these cancer risks are currently not well understood.    BRIP1, RAD51C and RAD51D  Mutations in BRIP1, RAD51C, and RAD51D have been shown to increase the risk of ovarian cancer and possibly female breast cancer as well14,15 .       Lifetime Cancer Risk    General Population BRIP1 RAD51C RAD51D   Ovarian 1-2% ~5-8% ~5-9% ~7-15%           Inheritance  All of the cancer syndromes reviewed above are inherited in an autosomal dominant pattern.  This means that if a parent has a mutation, each of his or her children will have a 50% chance of inheriting that same mutation.  Therefore, each child--male or female--would have a 50% chance of being at increased risk for developing cancer.      Image obtained from Genetics Home Reference, 2013     Mutations in some genes can occur de amelia, which means that a person s mutation occurred for the first time in them and was not inherited from a parent.  Now that they have the mutation, however, it can be passed on to future generations.    Genetic Testing  Genetic testing involves a blood test and will look at the genetic information in the MIKAEL, BRCA1, BRCA2, BRIP1, CDH1, CHEK2, MLH1, MSH2, MSH6, PMS2, EPCAM, PTEN, PALB2, RAD51C, RAD51D, and TP53 genes for any harmful mutations that are associated with increased cancer risk.  If possible, it is recommended that the person(s) who has had cancer be tested before other family members.  That person will give us the most useful information about whether or not a specific gene is associated with the cancer in the family.    Results  There are three possible results of genetic testing:    Positive--a harmful mutation  was identified in one or more of the genes    Negative--no mutation was identified in any of the genes on this panel    Variant of unknown significance--a variation in one of the genes was identified, but it is unclear how this impacts cancer risk in the family    Advantages and Disadvantages   There are advantages and disadvantages to genetic testing.    Advantages    May clarify your cancer risk    Can help you make medical decisions    May explain the cancers in your family    May give useful information to your family members (if you share your results)    Disadvantages    Possible negative emotional impact of learning about inherited cancer risk    Uncertainty in interpreting a negative test result in some situations    Possible genetic discrimination concerns (see below)    Genetic Information Nondiscrimination Act (GINGER)  GINGER is a federal law that protects individuals from health insurance or employment discrimination based on a genetic test result alone.  Although rare, there are currently no legal discrimination protections in terms of life insurance, long term care, or disability insurances.  Visit the National [employer]ence[employer] Research Tiro website to learn more.    Reducing Cancer Risk  All of the genes described above have nationally recognized cancer screening guidelines that would be recommended for individuals who test positive.  In addition to increased cancer screening, surgeries may be offered or recommended to reduce cancer risk.  Recommendations are based upon an individual s genetic test result as well as their personal and family history of cancer.    Questions to Think About Regarding Genetic Testing:    What effect will the test result have on me and my relationship with my family members if I have an inherited gene mutation?  If I don t have a gene mutation?    Should I share my test results, and how will my family react to this news, which may also affect them?    Are my children ready  to learn new information that may one day affect their own health?    Hereditary Cancer Resources    FORCE: Facing Our Risk of Cancer Empowered facingourrisk.org   Bright Pink bebrightpink.org   Li-Fraumeni Syndrome Association lfsassociation.org   PTEN World PTENworld.com   No stomach for cancer, Inc. nostomachforcancer.org   Stomach cancer relief network Scrnet.org   Collaborative Group of the Americas on Inherited Colorectal Cancer (CGA) cgaicc.com    Cancer Care cancercare.org   American Cancer Society (ACS) cancer.org   National Cancer Trinway (NCI) cancer.gov     Please call us if you have any questions or concerns.   Cancer Risk Management Program [phone] ([phone])  ? Cristiana Gaytan, MS, [facility]  [phone]  ? Perla Nava, MS, [facility]   [phone]  ? Pippa Casper, MS, [facility]  [phone]  ? Primitivo Da Silva, MS, [facility]  [phone]  ? Claudia Solis, MS, [facility]  [phone]  ? Kelsi Whitaker, MS, [facility]  [phone]    References  1. Tsering A, Oliver PDP, Susanne S, Rosenda HAIRSTON, Zhao JE, Timothy JL, Pawan N, Lola H, Maximus O, Raudel A, Sha B, Leroy P, Manyamileth S, Silva DM, Hugh N, Bridgette E, Danica H, Pa E, Armaan J, Gronwald J, Wandy B, John H, Thorlaci S, Eerola H, Leanna H, Marsha K, Amador OP. Average risks of breast and ovarian cancer associated with BRCA1 or BRCA2 mutations detected in case series unselected for family history: a combined analysis of 222 studies. Am J Hum Mariam. 2003;72:1117-30.  2. Debi GAGNON, Sarita MILLS, Bianca HUMPHREYS.  BRCA1 and BRCA2 Hereditary Breast and Ovarian Cancer. Gene Reviews online. 2013.  3. Shaquille YC, Amilcar S, Job G, Angela S. Breast cancer risk among male BRCA1 and BRCA2 mutation carriers. J Natl Cancer Inst. 2007;99:1811-4.  4. Arron HUNT, Lee I, Pascual J, Ernesto E, Rosalino ER, Rgegie F. Risk of breast cancer in male BRCA2 carriers. J Med Mariam. 2010;47:710-1.  5. National Comprehensive Cancer Network. Clinical practice guidelines in  oncology, colorectal cancer screening. Available online (registration required). 2015.  6. Taz MH, Delilah J, Kayla J, Devon LA, Shelia MS, Judah C. Lifetime cancer risks in individuals with germline PTEN mutations. Clin Cancer Res. 2012;18:400-7.  7. Mago MUNSON. Cowden Syndrome: A Critical Review of the Clinical Literature. J Mariam . 2009:18:13-27.  8. Ryan A, Jose Juan D, Hunter S, Lo P, Vanita T, Art M, Cyrus B, Mary Ellen H, Vaughn R, Varsha K, Yovana L, Arron DG, Silva D, Mu DF, Marie MR, The Breast Cancer Susceptibility Collaboration ([place]) & Jovanny GAGNON. MIKAEL mutations that cause ataxia-telangiectasia are breast cancer susceptibility alleles. Nature Genetics. 2006;38:873-875  9. Layton N , Ruth Y, Lashay J, Lenin L, Danielle GM , Cheng ML, Gallinger S, Palomino AG, Syngal S, Dayton ML, Milka J , Yanyd R, Stephen SZ, Byron JR, Eladio VE, Guerda M, Vogelstein B, Roseann N, Kiki RH, Gisella KW, and Lynch AP. MIKAEL mutations in patients with hereditary pancreatic cancer. Cancer Discover. 2012;2:41-46  10. Tsering MCGRATH, et al. Breast-Cancer Risk in Families with Mutations in PALB2. NEJM. 2014; 371(6):497-506.  11. CHEK2 Breast Cancer Case-Control Consortium. CHEK2*1100delC and susceptibility to breast cancer: A collaborative analysis involving 10,860 breast cancer cases and 9,065 controls from 10 studies. Am J Hum Mariam, 74 (2004), pp. 3773-7833  12. Kae T, Tia S, Richy K, et al. Spectrum of Mutations in BRCA1, BRCA2, CHEK2, and TP53 in Families at High Risk of Breast Cancer. ANU. 2006;295(12):0557-3468.   13. Madeline BURR, Zora MI, Rd BARBER, et al. Risk of breast cancer in women with a CHEK2 mutation with and without a family history of breast cancer. J Clin Oncol. 2011;29:9604-3786.  14. Mark H, Miki E, Arik CHIN, et al. Contribution of germline mutations in the RAD51B, RAD51C, and RAD51D genes to ovarian cancer in the population. J Clin Oncol.  2015;33(26):1074-5209. Doi:10.1200/JCO.2015.61.2408.  15. Mert T, Myrtle FRANKLIN, Samra P, et al. Mutations in BRIP1 confer high risk of ovarian cancer. Cary Mariam. 2011;43(11):4838-3162. doi:10.1038/ng.955.

## 2019-07-22 LAB — LAB SCANNED RESULT: NORMAL

## 2019-08-02 ENCOUNTER — OFFICE VISIT (OUTPATIENT)
Dept: ONCOLOGY | Facility: CLINIC | Age: 49
End: 2019-08-02
Attending: GENETIC COUNSELOR, MS
Payer: COMMERCIAL

## 2019-08-02 DIAGNOSIS — Z80.7 FAMILY HISTORY OF MULTIPLE MYELOMA: ICD-10-CM

## 2019-08-02 DIAGNOSIS — Z80.3 FAMILY HISTORY OF MALIGNANT NEOPLASM OF BREAST: ICD-10-CM

## 2019-08-02 DIAGNOSIS — Z80.41 FAMILY HISTORY OF MALIGNANT NEOPLASM OF OVARY: Primary | ICD-10-CM

## 2019-08-02 PROCEDURE — 40000072 ZZH STATISTIC GENETIC COUNSELING, < 16 MIN: Mod: ZF | Performed by: GENETIC COUNSELOR, MS

## 2019-08-02 NOTE — PROGRESS NOTES
"8/2/2019    Referring Provider: Jose Black MD    Presenting Information:  Kala Woo returned to the Cancer Risk Management Program at the [facility] to discuss her genetic testing results. Her blood was drawn on 6/28/2019.  CancerNext testing was ordered from [facility]. This testing was done because of her family history of breast and ovarian cancer.    Genetic Testing Result: NEGATIVE  Kala is negative for mutations in the APC, MIKAEL, BARD1, BRCA1, BRCA2, BRIP1, BMPR1A, CDH1, CDK4, CDKN2A, CHEK2, DICER1, EPCAM, HOXB13, GREM1, MLH1, MRE11A, MSH2, MSH6, MUTYH, NBN, NF1, PALB2, PMS2, POLD1, POLE, PTEN, RAD50, RAD51C, RAD51D, SMAD4, SMARCA4, STK11, and TP53 genes. No mutations were found in any of the 34 genes analyzed. This test involved sequencing and deletion/duplication analysis of all genes with the exception of EPCAM and GREM1 (deletions only).    Testing did not detect an identifiable mutation associated with Hereditary Breast and Ovarian Cancer syndrome (BRCA1, BRCA2), Ornelas syndrome (MLH1, MSH2, MSH6, PMS2, EPCAM), Familial Adenomatous Polyposis (APC), Hereditary Diffuse Gastric Cancer (CDH1), Familial Atypical Multiple Mole Melanoma syndrome (CDK4, CDKN2A), Juvenile Polyposis syndrome (BMPR1A, SMAD4), Cowden syndrome (PTEN), Li Fraumeni syndrome (TP53), Peutz-Jeghers syndrome (STK11), MUTYH Associated Polyposis (MUTYH), or Neurofibromatosis type 1 (NF1).    A copy of the test report can be found in the Laboratory tab, dated 6/28/2019, and named \"SEND OUTS MISC TEST\". The report is scanned in as a linked document.    Interpretation:  We discussed several different interpretations of this negative test result.    1. One explanation may be that there is a different gene or combination of genes and environment that are associated with the cancers in this family.  2. Another explanation may be that her sister did have a mutation in one of the 34 genes tested and she did not inherit it.  Of note, " Kala reports that her sister does carry a mutation in the BRCA1 gene, however a copy of her test report was not available for review.    3. There is also a small possibility that there is a mutation in one of these genes, and the testing laboratory could not find it with their current testing methods.       Screening:  Based on this negative test result, it is important for Kala and her relatives to refer back to the family history for appropriate cancer screening.      Based on her personal and family history, Kala has an up to 18.2% lifetime risk of developing breast cancer based on the HECTOR(v8) model.  Kala does not meet current National Comprehensive Cancer Network (NCCN) guidelines for high risk breast screening, which is offered to women with a 20% lifetime risk or higher. However, it is still important for Kala to continue with routine breast screening under the care of her physicians. Breast cancer screening is generally recommended to begin approximately 10 years younger than the earliest age of breast cancer diagnosis in the family, or at age 40, whichever comes first. Kala is encouraged to discuss breast screening with her physicians.      aKla's sister has a diagnosis of ovarian cancer, and was found to carry a mutation in the BRCA1 gene.  Given this history, Kala is likely at general population risk for ovarian cancer (assuming that her sister does carry a pathogenic mutation in this gene).  She is encouraged to obtain a copy of her sister's report for review, as this will help determine Kala's risk for cancer and considerations for cancer screening.     Other population cancer screening options, such as those recommended by the American Cancer Society and the National Comprehensive Cancer Network (NCCN), are also appropriate for Kala at this time. These screening recommendations may change if there are changes to Kala's personal and/or family history of cancer. Final screening  recommendations should be made by each individual's managing physician.     Additional Testing Considerations:  Although Kala's genetic testing result was negative, other relatives may still carry a mutation in the BRCA1 gene, or a different gene associated with an increased risk for cancer. Genetic counseling is recommended for her close relatives to discuss genetic testing options. If any of these relatives do pursue genetic testing, Kala is encouraged to contact me so that we may review the impact of their test results on her.    Summary:  We do not have an explanation for Kala's family history of breast cancer, as it is currently not clear whether her paternal aunt carried the BRCA1 gene mutation reportedly identified in Kala's sister.   In addition, she reports several close relatives diagnosed with cancer of unknown origin.  Because of that, it is important that she continue with cancer screening based on her personal and family history as discussed above, and should follow up if additional family history information is obtained.    Genetic testing is rapidly advancing, and new cancer susceptibility genes will most likely be identified in the future. Therefore, I encouraged Kala to contact me annually or if there are changes in her personal or family history. This may change how we assess her cancer risk, screening, and the testing we would offer.    Plan:  1.  I provided Kala with a copy of her test results today and a handout summarizing negative genetic test results (see after visit summary).  2. She plans to follow-up with her primary care provider.  3. She should contact me annually, or sooner if her family history changes.    If Kala has any further questions, I encouraged her to contact me at [phone].    Time spent face to face: 10 minutes    Pippa Casper MS, [facility]  Licensed Genetic Counselor  [phone]

## 2019-08-02 NOTE — LETTER
Cancer Risk Management  Program Locations    [facility]  [facility]  Mailing Address  Cancer Risk Management Program  [address]  [place] [address]    New patient appointments  [phone]  August 2, 2019    Kala Woo  [address] [address]      Dear Kala,    It was a pleasure meeting with you at the [facility] on 8/2/2019.  Here is a copy of the progress note from your recent genetic counseling visit to the Cancer Risk Management Program.  If you have any additional questions, please feel free to call.    Referring Provider: Jose Black MD    Presenting Information:  Kala Woo returned to the Cancer Risk Management Program at the [facility] to discuss her genetic testing results. Her blood was drawn on 6/28/2019.  CancerNext testing was ordered from [facility]. This testing was done because of her family history of breast and ovarian cancer.    Genetic Testing Result: NEGATIVE  Kala is negative for mutations in the APC, MIKAEL, BARD1, BRCA1, BRCA2, BRIP1, BMPR1A, CDH1, CDK4, CDKN2A, CHEK2, DICER1, EPCAM, HOXB13, GREM1, MLH1, MRE11A, MSH2, MSH6, MUTYH, NBN, NF1, PALB2, PMS2, POLD1, POLE, PTEN, RAD50, RAD51C, RAD51D, SMAD4, SMARCA4, STK11, and TP53 genes. No mutations were found in any of the 34 genes analyzed. This test involved sequencing and deletion/duplication analysis of all genes with the exception of EPCAM and GREM1 (deletions only).    Testing did not detect an identifiable mutation associated with Hereditary Breast and Ovarian Cancer syndrome (BRCA1, BRCA2), Ornelas syndrome (MLH1, MSH2, MSH6, PMS2, EPCAM), Familial Adenomatous Polyposis (APC), Hereditary Diffuse Gastric Cancer (CDH1), Familial Atypical Multiple Mole Melanoma syndrome  (CDK4, CDKN2A), Juvenile Polyposis syndrome (BMPR1A, SMAD4), Cowden syndrome (PTEN), Li Fraumeni syndrome (TP53), Peutz-Jeghers syndrome (STK11), MUTYH Associated Polyposis (MUTYH), or Neurofibromatosis type 1 (NF1).    Interpretation:  We discussed several different interpretations of this negative test result.    1. One explanation may be that there is a different gene or combination of genes and environment that are associated with the cancers in this family.  2. Another explanation may be that her sister did have a mutation in one of the 34 genes tested and she did not inherit it.  Of note, Kala reports that her sister does carry a mutation in the BRCA1 gene, however a copy of her test report was not available for review.    3. There is also a small possibility that there is a mutation in one of these genes, and the testing laboratory could not find it with their current testing methods.       Screening:  Based on this negative test result, it is important for Kala and her relatives to refer back to the family history for appropriate cancer screening.      Based on her personal and family history, Kala has an up to 18.2% lifetime risk of developing breast cancer based on the HECTOR(v8) model.  Kala does not meet current National Comprehensive Cancer Network (NCCN) guidelines for high risk breast screening, which is offered to women with a 20% lifetime risk or higher. However, it is still important for Kala to continue with routine breast screening under the care of her physicians. Breast cancer screening is generally recommended to begin approximately 10 years younger than the earliest age of breast cancer diagnosis in the family, or at age 40, whichever comes first. Kala is encouraged to discuss breast screening with her physicians.      Kala's sister has a diagnosis of ovarian cancer, and was found to carry a mutation in the BRCA1 gene.  Given this history, Kala is likely at general population risk for  ovarian cancer (assuming that her sister does carry a pathogenic mutation in this gene).  She is encouraged to obtain a copy of her sister's report for review, as this will help determine Kala's risk for cancer and considerations for cancer screening.     Other population cancer screening options, such as those recommended by the American Cancer Society and the National Comprehensive Cancer Network (NCCN), are also appropriate for Kala at this time. These screening recommendations may change if there are changes to Kala's personal and/or family history of cancer. Final screening recommendations should be made by each individual's managing physician.     Additional Testing Considerations:  Although Kala's genetic testing result was negative, other relatives may still carry a mutation in the BRCA1 gene, or a different gene associated with an increased risk for cancer. Genetic counseling is recommended for her close relatives to discuss genetic testing options. If any of these relatives do pursue genetic testing, Kala is encouraged to contact me so that we may review the impact of their test results on her.    Summary:  We do not have an explanation for Kala's family history of breast cancer, as it is currently not clear whether her paternal aunt carried the BRCA1 gene mutation reportedly identified in Kala's sister.   In addition, she reports several close relatives diagnosed with cancer of unknown origin.  Because of that, it is important that she continue with cancer screening based on her personal and family history as discussed above, and should follow up if additional family history information is obtained.    Genetic testing is rapidly advancing, and new cancer susceptibility genes will most likely be identified in the future. Therefore, I encouraged Kala to contact me annually or if there are changes in her personal or family history. This may change how we assess her cancer risk, screening, and the  testing we would offer.    Plan:  1.  I provided Klaa with a copy of her test results today and a handout summarizing negative genetic test results (see after visit summary).  2. She plans to follow-up with her primary care provider.  3. She should contact me annually, or sooner if her family history changes.    If Kala has any further questions, I encouraged her to contact me at [phone].    Pippa Casper MS, [facility]  Licensed Genetic Counselor  [phone]

## 2019-08-02 NOTE — PATIENT INSTRUCTIONS
Negative Genetic Test Result    Genetic Testing  You had a blood test that looked at the genetic information in one or more genes associated with increased cancer risk.  The testing looked for any harmful changes that would stop this particular gene from working like it should. If an individual does not have any harmful changes or variants of unknown significance found from their blood test, their genetic test result is reported as negative.       Results  The genetic test did not identify any pathogenic (harmful) changes in the genes that were tested. There are several possible explanations for a negative test result. Without knowing the gene mutation in your family, the cause of the cancer in you or your relatives is still unknown. Your genetic counselor can help interpret the result for you and your relatives. In this case, there are several reasons that may explain the negative test result:    There may be a gene mutation in the family that you did not inherit.     You may have a gene mutation in a different gene that was not included in the test, or has not yet been discovered.     The cancers in you or your family may be due to a combination of genetic factors and environment (multifactorial/familial).    The cancers in you or your family may be sporadic/random cancers.    There is very small chance that a mutation was not found by current testing methods.  As testing technology evolves over time, it may still be possible to identify a mutation in a gene that was not found on this test.    It is important to note which genes were included in your test. A list of these genes can be found on your test result.    Screening Recommendations  Due to this negative test result, cancer screening recommendations should be based on your personal and family history. This may include increased cancer screening for you and/or your family members. Your genetic counselor and health care provider can help make  appropriate recommendations.      Please call us if you have any questions or concerns.   Cancer Risk Management Program [phone] ([phone])  ? Cristiana Gaytan, MS, [facility]  [phone]  ? Matthew Vieira, MS  [phone]  ? Perla Nava, MS, [facility]  [phone]  ? Pippa Casper, MS, [facility]  [phone]  ? Primitivo Da Silva, MS, [facility]  [phone]  ? Claudia Solis, MS, [facility] [phone]  ? Kelsi Whitaker, MS, [facility] [phone]

## 2019-08-02 NOTE — LETTER
"8/2/2019       RE: Kala Woo  [address] [place] [address]     Dear Colleague,    Thank you for referring your patient, Kala Woo, to the [facility]. Please see a copy of my visit note below.    8/2/2019    Referring Provider: Jose Black MD    Presenting Information:  Kala Woo returned to the Cancer Risk Management Program at the [facility] to discuss her genetic testing results. Her blood was drawn on 6/28/2019.  CancerNext testing was ordered from [facility]. This testing was done because of her family history of breast and ovarian cancer.    Genetic Testing Result: NEGATIVE  Kala is negative for mutations in the APC, MIKAEL, BARD1, BRCA1, BRCA2, BRIP1, BMPR1A, CDH1, CDK4, CDKN2A, CHEK2, DICER1, EPCAM, HOXB13, GREM1, MLH1, MRE11A, MSH2, MSH6, MUTYH, NBN, NF1, PALB2, PMS2, POLD1, POLE, PTEN, RAD50, RAD51C, RAD51D, SMAD4, SMARCA4, STK11, and TP53 genes. No mutations were found in any of the 34 genes analyzed. This test involved sequencing and deletion/duplication analysis of all genes with the exception of EPCAM and GREM1 (deletions only).    Testing did not detect an identifiable mutation associated with Hereditary Breast and Ovarian Cancer syndrome (BRCA1, BRCA2), Ornelas syndrome (MLH1, MSH2, MSH6, PMS2, EPCAM), Familial Adenomatous Polyposis (APC), Hereditary Diffuse Gastric Cancer (CDH1), Familial Atypical Multiple Mole Melanoma syndrome (CDK4, CDKN2A), Juvenile Polyposis syndrome (BMPR1A, SMAD4), Cowden syndrome (PTEN), Li Fraumeni syndrome (TP53), Peutz-Jeghers syndrome (STK11), MUTYH Associated Polyposis (MUTYH), or Neurofibromatosis type 1 (NF1).    A copy of the test report can be found in the Laboratory tab, dated 6/28/2019, and named \"SEND OUTS MISC TEST\". The report is scanned in as a linked document.    Interpretation:  We discussed several different interpretations of this negative test result.    1. One explanation may be that there is a " different gene or combination of genes and environment that are associated with the cancers in this family.  2. Another explanation may be that her sister did have a mutation in one of the 34 genes tested and she did not inherit it.  Of note, Kala reports that her sister does carry a mutation in the BRCA1 gene, however a copy of her test report was not available for review.    3. There is also a small possibility that there is a mutation in one of these genes, and the testing laboratory could not find it with their current testing methods.       Screening:  Based on this negative test result, it is important for Kala and her relatives to refer back to the family history for appropriate cancer screening.      Based on her personal and family history, Kala has an up to 18.2% lifetime risk of developing breast cancer based on the HECTOR(v8) model.  Kala does not meet current National Comprehensive Cancer Network (NCCN) guidelines for high risk breast screening, which is offered to women with a 20% lifetime risk or higher. However, it is still important for Kala to continue with routine breast screening under the care of her physicians. Breast cancer screening is generally recommended to begin approximately 10 years younger than the earliest age of breast cancer diagnosis in the family, or at age 40, whichever comes first. Kala is encouraged to discuss breast screening with her physicians.      Kala's sister has a diagnosis of ovarian cancer, and was found to carry a mutation in the BRCA1 gene.  Given this history, Kala is likely at general population risk for ovarian cancer (assuming that her sister does carry a pathogenic mutation in this gene).  She is encouraged to obtain a copy of her sister's report for review, as this will help determine Kala's risk for cancer and considerations for cancer screening.     Other population cancer screening options, such as those recommended by the American Cancer Society and  the National Comprehensive Cancer Network (NCCN), are also appropriate for Kala at this time. These screening recommendations may change if there are changes to Kala's personal and/or family history of cancer. Final screening recommendations should be made by each individual's managing physician.     Additional Testing Considerations:  Although Kala's genetic testing result was negative, other relatives may still carry a mutation in the BRCA1 gene, or a different gene associated with an increased risk for cancer. Genetic counseling is recommended for her close relatives to discuss genetic testing options. If any of these relatives do pursue genetic testing, Kala is encouraged to contact me so that we may review the impact of their test results on her.    Summary:  We do not have an explanation for Kala's family history of breast cancer, as it is currently not clear whether her paternal aunt carried the BRCA1 gene mutation reportedly identified in Kala's sister.   In addition, she reports several close relatives diagnosed with cancer of unknown origin.  Because of that, it is important that she continue with cancer screening based on her personal and family history as discussed above, and should follow up if additional family history information is obtained.    Genetic testing is rapidly advancing, and new cancer susceptibility genes will most likely be identified in the future. Therefore, I encouraged Kala to contact me annually or if there are changes in her personal or family history. This may change how we assess her cancer risk, screening, and the testing we would offer.    Plan:  1.  I provided Kala with a copy of her test results today and a handout summarizing negative genetic test results (see after visit summary).  2. She plans to follow-up with her primary care provider.  3. She should contact me annually, or sooner if her family history changes.    If Kala has any further questions, I encouraged  her to contact me at [phone].    Time spent face to face: 10 minutes    Pippa Casper MS, [facility]  Licensed Genetic Counselor  [phone]

## 2019-09-30 ENCOUNTER — HEALTH MAINTENANCE LETTER (OUTPATIENT)
Age: 49
End: 2019-09-30

## 2021-01-15 ENCOUNTER — HEALTH MAINTENANCE LETTER (OUTPATIENT)
Age: 51
End: 2021-01-15

## 2021-03-14 ENCOUNTER — HEALTH MAINTENANCE LETTER (OUTPATIENT)
Age: 51
End: 2021-03-14

## 2021-10-24 ENCOUNTER — HEALTH MAINTENANCE LETTER (OUTPATIENT)
Age: 51
End: 2021-10-24

## 2022-02-13 ENCOUNTER — HEALTH MAINTENANCE LETTER (OUTPATIENT)
Age: 52
End: 2022-02-13

## 2022-04-10 ENCOUNTER — HEALTH MAINTENANCE LETTER (OUTPATIENT)
Age: 52
End: 2022-04-10

## 2022-10-15 ENCOUNTER — HEALTH MAINTENANCE LETTER (OUTPATIENT)
Age: 52
End: 2022-10-15

## 2023-03-26 ENCOUNTER — HEALTH MAINTENANCE LETTER (OUTPATIENT)
Age: 53
End: 2023-03-26

## 2023-06-01 ENCOUNTER — HEALTH MAINTENANCE LETTER (OUTPATIENT)
Age: 53
End: 2023-06-01